# Patient Record
Sex: FEMALE | URBAN - METROPOLITAN AREA
[De-identification: names, ages, dates, MRNs, and addresses within clinical notes are randomized per-mention and may not be internally consistent; named-entity substitution may affect disease eponyms.]

---

## 2023-09-18 RX ORDER — PEN NEEDLE, DIABETIC 32GX 5/32"
1 NEEDLE, DISPOSABLE MISCELLANEOUS 2 TIMES DAILY
Qty: 100 EACH | Refills: 2 | Status: SHIPPED | OUTPATIENT
Start: 2023-09-18 | End: 2023-10-18

## 2023-09-18 RX ORDER — PEN NEEDLE, DIABETIC 32GX 5/32"
1 NEEDLE, DISPOSABLE MISCELLANEOUS 2 TIMES DAILY
COMMUNITY
Start: 2023-07-03 | End: 2023-09-18 | Stop reason: SDUPTHER

## 2023-09-25 NOTE — TELEPHONE ENCOUNTER
Lorenzo Berg is calling to request a refill on the following medication(s):    Medication Request:  Requested Prescriptions     Pending Prescriptions Disp Refills    metoprolol tartrate (LOPRESSOR) 25 MG tablet [Pharmacy Med Name: Metoprolol Tartrate Oral Tablet 25 MG] 90 tablet 0     Sig: TAKE 1 TABLET BY MOUTH EVERY DAY WITH FOOD       Last Visit Date (If Applicable):  Visit date not found    Next Visit Date:    11/20/2023

## 2023-10-10 ENCOUNTER — TELEPHONE (OUTPATIENT)
Age: 78
End: 2023-10-10

## 2023-10-10 NOTE — TELEPHONE ENCOUNTER
Patient called to ask if you are recommending the following vaccines for her (said is diabetic and 68 y/o)  Flu  RSV  Covid booster  Pneumonia - Marlen Sandy said she is due  Tetanus - [facility] said she is due  Please advise [phone]

## 2023-10-10 NOTE — TELEPHONE ENCOUNTER
She can get flu and prevnar 20.   I would wait on RSV for now  COVID vaccine is ok  TdaP usually isn't covered by medicare but her last was 2010

## 2023-11-13 NOTE — PROGRESS NOTES
Granul* 11/14/2023 <0.03     Sodium 11/14/2023 139     Potassium 11/14/2023 4.2     Chloride 11/14/2023 104     CO2 11/14/2023 23     Anion Gap 11/14/2023 12     Glucose 11/14/2023 129 (H)     BUN 11/14/2023 20     Creatinine 11/14/2023 0.7     Est, Glom Filt Rate 11/14/2023 >60     Calcium 11/14/2023 9.3     Total Protein 11/14/2023 7.4     Albumin 11/14/2023 4.1     Albumin/Globulin Ratio 11/14/2023 1.0     Total Bilirubin 11/14/2023 0.5     Alkaline Phosphatase 11/14/2023 81     ALT 11/14/2023 26     AST 11/14/2023 21     Vit D, 25-Hydroxy 11/14/2023 54.2       LDL: 66 on 3/23  Urine micro was normal 3/23    ASSESSMENT/PLAN     - Recent labs were reviewed, results were grossly normal.  - Mammogram was ordered. - DEXA was ordered. - I recommend increased water intake for cramping.  - I advise continuing regular walks, finding somewhere to walk indoors during the cold weather is recommended. - Labs were ordered for completion prior to next visit. - Medicare annual wellness visit was performed in-office today. - Follow-up in 6 months. 1. Essential hypertension  Comments:  Controlled with current regimen of Lopressor and hydrochlorothiazide. 2. Type 2 diabetes mellitus without complication, with long-term current use of insulin ([address])  Comments: Follows with endocrinology. Off [place]b due to hypotension. Orders:  -     CBC with Auto Differential; Future  -     Comprehensive Metabolic Panel; Future  -     Vitamin B12; Future  -     Microalbumin, Ur; Future  3. Mixed hyperlipidemia  Comments:  Controlled with Lipitor. Orders:  -     Lipid Panel; Future  -     T4, Free; Future  -     TSH; Future  4. Osteopenia, unspecified location  Comments:  Controlled with oral calcium and Vitamin D supplements. 5. Screening mammogram for breast cancer  -     [facility] VICTORIA DIGITAL SCREEN BILATERAL; Future  6. Postmenopausal  -     DEXA BONE DENSITY AXIAL SKELETON; Future  7. Facial skin lesion  Comments:   Follows with

## 2023-11-14 ENCOUNTER — HOSPITAL ENCOUNTER (OUTPATIENT)
Age: 78
Setting detail: SPECIMEN
Discharge: HOME | End: 2023-11-14

## 2023-11-14 LAB
25(OH)D3 SERPL-MCNC: 54.2 NG/ML (ref 30–100)
ALBUMIN SERPL-MCNC: 4.1 G/DL (ref 3.5–5.2)
ALBUMIN/GLOB SERPL: 1 {RATIO} (ref 1–2.5)
ALP SERPL-CCNC: 81 U/L (ref 35–104)
ALT SERPL-CCNC: 26 U/L (ref 10–35)
ANION GAP SERPL CALCULATED.3IONS-SCNC: 12 MMOL/L (ref 9–16)
AST SERPL-CCNC: 21 U/L (ref 10–35)
BASOPHILS # BLD: 0.06 K/UL (ref 0–0.2)
BASOPHILS NFR BLD: 1 % (ref 0–2)
BILIRUB SERPL-MCNC: 0.5 MG/DL (ref 0–1.2)
BUN SERPL-MCNC: 20 MG/DL (ref 8–23)
CALCIUM SERPL-MCNC: 9.3 MG/DL (ref 8.6–10.4)
CHLORIDE SERPL-SCNC: 104 MMOL/L (ref 98–107)
CO2 SERPL-SCNC: 23 MMOL/L (ref 20–31)
CREAT SERPL-MCNC: 0.7 MG/DL (ref 0.5–0.9)
EOSINOPHIL # BLD: 0.23 K/UL (ref 0–0.44)
EOSINOPHILS RELATIVE PERCENT: 3 % (ref 1–4)
ERYTHROCYTE [DISTWIDTH] IN BLOOD BY AUTOMATED COUNT: 13.9 % (ref 11.8–14.4)
GFR SERPL CREATININE-BSD FRML MDRD: >60 ML/MIN/1.73M2
GLUCOSE SERPL-MCNC: 129 MG/DL (ref 74–99)
HCT VFR BLD AUTO: 46.1 % (ref 36.3–47.1)
HGB BLD-MCNC: 14.4 G/DL (ref 11.9–15.1)
IMM GRANULOCYTES # BLD AUTO: <0.03 K/UL (ref 0–0.3)
IMM GRANULOCYTES NFR BLD: 0 %
LYMPHOCYTES NFR BLD: 2.19 K/UL (ref 1.1–3.7)
LYMPHOCYTES RELATIVE PERCENT: 30 % (ref 24–43)
MCH RBC QN AUTO: 30.8 PG (ref 25.2–33.5)
MCHC RBC AUTO-ENTMCNC: 31.2 G/DL (ref 28.4–34.8)
MCV RBC AUTO: 98.7 FL (ref 82.6–102.9)
MONOCYTES NFR BLD: 0.63 K/UL (ref 0.1–1.2)
MONOCYTES NFR BLD: 9 % (ref 3–12)
NEUTROPHILS NFR BLD: 57 % (ref 36–65)
NEUTS SEG NFR BLD: 4.3 K/UL (ref 1.5–8.1)
NRBC BLD-RTO: 0 PER 100 WBC
PLATELET # BLD AUTO: 263 K/UL (ref 138–453)
PMV BLD AUTO: 10.6 FL (ref 8.1–13.5)
POTASSIUM SERPL-SCNC: 4.2 MMOL/L (ref 3.7–5.3)
PROT SERPL-MCNC: 7.4 G/DL (ref 6.6–8.7)
RBC # BLD AUTO: 4.67 M/UL (ref 3.95–5.11)
SODIUM SERPL-SCNC: 139 MMOL/L (ref 136–145)
WBC OTHER # BLD: 7.4 K/UL (ref 3.5–11.3)

## 2023-11-18 DIAGNOSIS — L57.0 ACTINIC KERATOSES: Primary | ICD-10-CM

## 2023-11-18 DIAGNOSIS — E55.9 VITAMIN D DEFICIENCY: ICD-10-CM

## 2023-11-18 DIAGNOSIS — N20.0 KIDNEY STONE: ICD-10-CM

## 2023-11-18 PROBLEM — J30.9 ALLERGIC RHINITIS: Status: ACTIVE | Noted: 2023-11-18

## 2023-11-18 PROBLEM — K21.9 GERD (GASTROESOPHAGEAL REFLUX DISEASE): Status: ACTIVE | Noted: 2023-11-18

## 2023-11-18 PROBLEM — E11.9 TYPE 2 DIABETES MELLITUS WITHOUT COMPLICATION (HCC): Status: ACTIVE | Noted: 2023-11-18

## 2023-11-18 PROBLEM — E78.2 MIXED HYPERLIPIDEMIA: Status: ACTIVE | Noted: 2023-04-12

## 2023-11-18 PROBLEM — I10 ESSENTIAL HYPERTENSION: Status: ACTIVE | Noted: 2023-04-12

## 2023-11-18 PROBLEM — M85.80 OSTEOPENIA: Status: ACTIVE | Noted: 2023-04-12

## 2023-11-18 RX ORDER — METFORMIN HYDROCHLORIDE 500 MG/1
TABLET, EXTENDED RELEASE ORAL
COMMUNITY
Start: 2023-10-06

## 2023-11-18 RX ORDER — ASPIRIN 81 MG/1
TABLET ORAL
COMMUNITY
Start: 2008-12-09

## 2023-11-18 RX ORDER — PIOGLITAZONEHYDROCHLORIDE 15 MG/1
TABLET ORAL
COMMUNITY
Start: 2023-09-23

## 2023-11-18 RX ORDER — DAPAGLIFLOZIN 10 MG/1
10 TABLET, FILM COATED ORAL EVERY MORNING
COMMUNITY
Start: 2023-10-06

## 2023-11-18 RX ORDER — VALSARTAN AND HYDROCHLOROTHIAZIDE 160; 12.5 MG/1; MG/1
TABLET, FILM COATED ORAL
COMMUNITY

## 2023-11-18 RX ORDER — GLIMEPIRIDE 4 MG/1
TABLET ORAL
COMMUNITY
Start: 2023-09-26

## 2023-11-18 RX ORDER — ATORVASTATIN CALCIUM 20 MG/1
20 TABLET, FILM COATED ORAL
COMMUNITY
Start: 2023-09-20

## 2023-11-18 RX ORDER — INSULIN DETEMIR 100 [IU]/ML
INJECTION, SOLUTION SUBCUTANEOUS
COMMUNITY
Start: 2023-09-07

## 2023-11-18 RX ORDER — BLOOD-GLUCOSE METER
KIT MISCELLANEOUS
COMMUNITY
Start: 2023-08-15

## 2023-11-18 RX ORDER — HYDROCHLOROTHIAZIDE 12.5 MG/1
CAPSULE, GELATIN COATED ORAL DAILY
COMMUNITY
Start: 2023-09-15

## 2023-11-20 ENCOUNTER — OFFICE VISIT (OUTPATIENT)
Age: 78
End: 2023-11-20
Payer: MEDICARE

## 2023-11-20 VITALS
TEMPERATURE: 97.8 F | HEIGHT: 64 IN | DIASTOLIC BLOOD PRESSURE: 74 MMHG | HEART RATE: 84 BPM | BODY MASS INDEX: 33.12 KG/M2 | RESPIRATION RATE: 12 BRPM | WEIGHT: 194 LBS | OXYGEN SATURATION: 94 % | SYSTOLIC BLOOD PRESSURE: 128 MMHG

## 2023-11-20 DIAGNOSIS — I10 ESSENTIAL HYPERTENSION: Primary | ICD-10-CM

## 2023-11-20 DIAGNOSIS — L98.9 FACIAL SKIN LESION: ICD-10-CM

## 2023-11-20 DIAGNOSIS — M85.80 OSTEOPENIA, UNSPECIFIED LOCATION: ICD-10-CM

## 2023-11-20 DIAGNOSIS — Z79.4 TYPE 2 DIABETES MELLITUS WITHOUT COMPLICATION, WITH LONG-TERM CURRENT USE OF INSULIN (HCC): ICD-10-CM

## 2023-11-20 DIAGNOSIS — E11.9 TYPE 2 DIABETES MELLITUS WITHOUT COMPLICATION, WITH LONG-TERM CURRENT USE OF INSULIN (HCC): ICD-10-CM

## 2023-11-20 DIAGNOSIS — Z78.0 POSTMENOPAUSAL: ICD-10-CM

## 2023-11-20 DIAGNOSIS — E78.2 MIXED HYPERLIPIDEMIA: ICD-10-CM

## 2023-11-20 DIAGNOSIS — Z12.31 SCREENING MAMMOGRAM FOR BREAST CANCER: ICD-10-CM

## 2023-11-20 DIAGNOSIS — Z00.00 MEDICARE ANNUAL WELLNESS VISIT, SUBSEQUENT: ICD-10-CM

## 2023-11-20 DIAGNOSIS — R01.1 HEART MURMUR: ICD-10-CM

## 2023-11-20 PROCEDURE — 3078F DIAST BP <80 MM HG: CPT | Performed by: FAMILY MEDICINE

## 2023-11-20 PROCEDURE — 1090F PRES/ABSN URINE INCON ASSESS: CPT | Performed by: FAMILY MEDICINE

## 2023-11-20 PROCEDURE — G8417 CALC BMI ABV UP PARAM F/U: HCPCS | Performed by: FAMILY MEDICINE

## 2023-11-20 PROCEDURE — 3074F SYST BP LT 130 MM HG: CPT | Performed by: FAMILY MEDICINE

## 2023-11-20 PROCEDURE — G0439 PPPS, SUBSEQ VISIT: HCPCS | Performed by: FAMILY MEDICINE

## 2023-11-20 PROCEDURE — 1123F ACP DISCUSS/DSCN MKR DOCD: CPT | Performed by: FAMILY MEDICINE

## 2023-11-20 PROCEDURE — G8484 FLU IMMUNIZE NO ADMIN: HCPCS | Performed by: FAMILY MEDICINE

## 2023-11-20 PROCEDURE — G8427 DOCREV CUR MEDS BY ELIG CLIN: HCPCS | Performed by: FAMILY MEDICINE

## 2023-11-20 PROCEDURE — 99214 OFFICE O/P EST MOD 30 MIN: CPT | Performed by: FAMILY MEDICINE

## 2023-11-20 PROCEDURE — 4004F PT TOBACCO SCREEN RCVD TLK: CPT | Performed by: FAMILY MEDICINE

## 2023-11-20 PROCEDURE — G8400 PT W/DXA NO RESULTS DOC: HCPCS | Performed by: FAMILY MEDICINE

## 2023-11-20 RX ORDER — FOLIC ACID 0.8 MG
500 TABLET ORAL DAILY
COMMUNITY

## 2023-11-20 RX ORDER — LANOLIN ALCOHOL/MO/W.PET/CERES
1000 CREAM (GRAM) TOPICAL DAILY
COMMUNITY

## 2023-11-20 SDOH — ECONOMIC STABILITY: INCOME INSECURITY: HOW HARD IS IT FOR YOU TO PAY FOR THE VERY BASICS LIKE FOOD, HOUSING, MEDICAL CARE, AND HEATING?: NOT HARD AT ALL

## 2023-11-20 SDOH — ECONOMIC STABILITY: FOOD INSECURITY: WITHIN THE PAST 12 MONTHS, YOU WORRIED THAT YOUR FOOD WOULD RUN OUT BEFORE YOU GOT MONEY TO BUY MORE.: NEVER TRUE

## 2023-11-20 SDOH — ECONOMIC STABILITY: HOUSING INSECURITY
IN THE LAST 12 MONTHS, WAS THERE A TIME WHEN YOU DID NOT HAVE A STEADY PLACE TO SLEEP OR SLEPT IN A SHELTER (INCLUDING NOW)?: NO

## 2023-11-20 SDOH — ECONOMIC STABILITY: FOOD INSECURITY: WITHIN THE PAST 12 MONTHS, THE FOOD YOU BOUGHT JUST DIDN'T LAST AND YOU DIDN'T HAVE MONEY TO GET MORE.: NEVER TRUE

## 2023-11-20 ASSESSMENT — PATIENT HEALTH QUESTIONNAIRE - PHQ9
2. FEELING DOWN, DEPRESSED OR HOPELESS: 0
SUM OF ALL RESPONSES TO PHQ9 QUESTIONS 1 & 2: 0
SUM OF ALL RESPONSES TO PHQ QUESTIONS 1-9: 0
1. LITTLE INTEREST OR PLEASURE IN DOING THINGS: 0

## 2023-11-20 NOTE — PATIENT INSTRUCTIONS
- Recent labs were reviewed, results were grossly normal.  - Mammogram was ordered. - DEXA was ordered. - I recommend increased water intake for cramping.  - I advise continuing regular walks, finding somewhere to walk indoors during the cold weather is recommended. - Labs were ordered for completion prior to next visit. - Medicare annual wellness visit was performed in-office today. - Follow-up in 6 months.

## 2023-12-16 PROBLEM — J15.9 BACTERIAL PNEUMONIA: Status: ACTIVE | Noted: 2023-12-16

## 2023-12-17 PROBLEM — E11.9 INSULIN DEPENDENT TYPE 2 DIABETES MELLITUS (HCC): Status: ACTIVE | Noted: 2023-12-17

## 2023-12-17 PROBLEM — E87.1 HYPONATREMIA: Status: ACTIVE | Noted: 2023-12-17

## 2023-12-17 PROBLEM — R78.81 BACTEREMIA: Status: ACTIVE | Noted: 2023-12-17

## 2023-12-17 PROBLEM — R79.89 ELEVATED TROPONIN: Status: ACTIVE | Noted: 2023-12-17

## 2023-12-17 PROBLEM — N39.0 SEPSIS DUE TO GRAM-NEGATIVE UTI (HCC): Status: ACTIVE | Noted: 2023-12-17

## 2023-12-17 PROBLEM — Z79.4 INSULIN DEPENDENT TYPE 2 DIABETES MELLITUS (HCC): Status: ACTIVE | Noted: 2023-12-17

## 2023-12-17 PROBLEM — G92.8 TOXIC METABOLIC ENCEPHALOPATHY: Status: ACTIVE | Noted: 2023-12-17

## 2023-12-17 PROBLEM — N17.9 ACUTE KIDNEY INJURY (HCC): Status: ACTIVE | Noted: 2023-12-17

## 2023-12-17 PROBLEM — A41.50 SEPSIS DUE TO GRAM-NEGATIVE UTI (HCC): Status: ACTIVE | Noted: 2023-12-17

## 2023-12-19 PROBLEM — N12 PYELITIS: Status: ACTIVE | Noted: 2023-12-19

## 2023-12-19 PROBLEM — N20.1 CALCULUS OF PROXIMAL RIGHT URETER: Status: ACTIVE | Noted: 2023-12-19

## 2023-12-19 PROBLEM — A41.51 SEPSIS DUE TO ESCHERICHIA COLI WITHOUT ACUTE ORGAN DYSFUNCTION (HCC): Status: ACTIVE | Noted: 2023-12-19

## 2023-12-19 PROBLEM — R41.82 ALTERED MENTAL STATUS: Status: ACTIVE | Noted: 2023-12-19

## 2023-12-21 PROBLEM — G92.8 TOXIC METABOLIC ENCEPHALOPATHY: Status: RESOLVED | Noted: 2023-12-17 | Resolved: 2023-12-21

## 2023-12-21 PROBLEM — N17.9 ACUTE KIDNEY INJURY (HCC): Status: RESOLVED | Noted: 2023-12-17 | Resolved: 2023-12-21

## 2023-12-21 PROBLEM — E87.1 HYPONATREMIA: Status: RESOLVED | Noted: 2023-12-17 | Resolved: 2023-12-21

## 2023-12-21 PROBLEM — R41.82 ALTERED MENTAL STATUS: Status: RESOLVED | Noted: 2023-12-19 | Resolved: 2023-12-21

## 2023-12-21 PROBLEM — R79.89 ELEVATED TROPONIN: Status: RESOLVED | Noted: 2023-12-17 | Resolved: 2023-12-21

## 2023-12-21 PROBLEM — J15.9 BACTERIAL PNEUMONIA: Status: RESOLVED | Noted: 2023-12-16 | Resolved: 2023-12-21

## 2023-12-27 ENCOUNTER — OFFICE VISIT (OUTPATIENT)
Age: 78
End: 2023-12-27

## 2023-12-27 VITALS
OXYGEN SATURATION: 95 % | WEIGHT: 188 LBS | RESPIRATION RATE: 14 BRPM | TEMPERATURE: 98 F | HEIGHT: 65 IN | SYSTOLIC BLOOD PRESSURE: 120 MMHG | HEART RATE: 102 BPM | BODY MASS INDEX: 31.32 KG/M2 | DIASTOLIC BLOOD PRESSURE: 64 MMHG

## 2023-12-27 DIAGNOSIS — N17.9 SEPSIS DUE TO ESCHERICHIA COLI WITH ACUTE RENAL FAILURE WITHOUT SEPTIC SHOCK, UNSPECIFIED ACUTE RENAL FAILURE TYPE (HCC): Primary | ICD-10-CM

## 2023-12-27 DIAGNOSIS — N20.1 CALCULUS OF PROXIMAL RIGHT URETER: ICD-10-CM

## 2023-12-27 DIAGNOSIS — Z79.4 TYPE 2 DIABETES MELLITUS WITHOUT COMPLICATION, WITH LONG-TERM CURRENT USE OF INSULIN (HCC): ICD-10-CM

## 2023-12-27 DIAGNOSIS — E11.9 TYPE 2 DIABETES MELLITUS WITHOUT COMPLICATION, WITH LONG-TERM CURRENT USE OF INSULIN (HCC): ICD-10-CM

## 2023-12-27 DIAGNOSIS — Z09 HOSPITAL DISCHARGE FOLLOW-UP: ICD-10-CM

## 2023-12-27 DIAGNOSIS — A41.51 SEPSIS DUE TO ESCHERICHIA COLI WITH ACUTE RENAL FAILURE WITHOUT SEPTIC SHOCK, UNSPECIFIED ACUTE RENAL FAILURE TYPE (HCC): Primary | ICD-10-CM

## 2023-12-27 DIAGNOSIS — R65.20 SEPSIS DUE TO ESCHERICHIA COLI WITH ACUTE RENAL FAILURE WITHOUT SEPTIC SHOCK, UNSPECIFIED ACUTE RENAL FAILURE TYPE (HCC): Primary | ICD-10-CM

## 2023-12-27 NOTE — PROGRESS NOTES
11/14/2023 104     CO2 11/14/2023 23     Anion Gap 11/14/2023 12     Glucose 11/14/2023 129 (H)     BUN 11/14/2023 20     Creatinine 11/14/2023 0.7     Est, Glom Filt Rate 11/14/2023 >60     Calcium 11/14/2023 9.3     Total Protein 11/14/2023 7.4     Albumin 11/14/2023 4.1     Albumin/Globulin Ratio 11/14/2023 1.0     Total Bilirubin 11/14/2023 0.5     Alkaline Phosphatase 11/14/2023 81     ALT 11/14/2023 26     AST 11/14/2023 21     Vit D, 25-Hydroxy 11/14/2023 54.2         ASSESSMENT/PLAN     1. Sepsis due to Escherichia coli with acute renal failure without septic shock, unspecified acute renal failure type ([address])  -     CBC with Auto Differential; Future  -     Comprehensive Metabolic Panel; Future  2. Hospital discharge follow-up  -     CT DISCHARGE MEDS RECONCILED W/ CURRENT OUTPATIENT MED LIST  3. Type 2 diabetes mellitus without complication, with long-term current use of insulin ([facility])  4. Calculus of proximal right ureter       Plans for cysto/stent removal 1/5/23  [facility] endocrinology  Discussed Good BS control/prevent hypoglycemia  Echo for heart murmur scheduled January 2024      Return in 1 month (on 1/27/2024).         Electronically signed by Jin Quesada DO on 12/27/2023 at 6:25 PM

## 2024-01-02 ENCOUNTER — HOSPITAL ENCOUNTER (OUTPATIENT)
Age: 79
Setting detail: SPECIMEN
Discharge: HOME | End: 2024-01-02

## 2024-01-02 ENCOUNTER — ANESTHESIA EVENT (OUTPATIENT)
Dept: OPERATING ROOM | Age: 79
End: 2024-01-02
Payer: MEDICARE

## 2024-01-02 LAB
ALBUMIN SERPL-MCNC: 3.8 G/DL (ref 3.5–5.2)
ALBUMIN/GLOB SERPL: 1 {RATIO} (ref 1–2.5)
ALP SERPL-CCNC: 104 U/L (ref 35–104)
ALT SERPL-CCNC: 26 U/L (ref 5–33)
ANION GAP SERPL CALCULATED.3IONS-SCNC: 14 MMOL/L (ref 9–17)
AST SERPL-CCNC: 22 U/L
BASOPHILS # BLD: 0.09 K/UL (ref 0–0.2)
BASOPHILS NFR BLD: 1 % (ref 0–2)
BILIRUB SERPL-MCNC: 0.4 MG/DL (ref 0.3–1.2)
BUN SERPL-MCNC: 23 MG/DL (ref 8–23)
CALCIUM SERPL-MCNC: 9.7 MG/DL (ref 8.6–10.4)
CHLORIDE SERPL-SCNC: 101 MMOL/L (ref 98–107)
CO2 SERPL-SCNC: 22 MMOL/L (ref 20–31)
CREAT SERPL-MCNC: 1.1 MG/DL (ref 0.5–0.9)
EOSINOPHIL # BLD: 0.15 K/UL (ref 0–0.44)
EOSINOPHILS RELATIVE PERCENT: 2 % (ref 1–4)
ERYTHROCYTE [DISTWIDTH] IN BLOOD BY AUTOMATED COUNT: 14.4 % (ref 11.8–14.4)
GFR SERPL CREATININE-BSD FRML MDRD: 52 ML/MIN/1.73M2
GLUCOSE SERPL-MCNC: 157 MG/DL (ref 70–99)
HCT VFR BLD AUTO: 41.8 % (ref 36.3–47.1)
HGB BLD-MCNC: 13.2 G/DL (ref 11.9–15.1)
IMM GRANULOCYTES # BLD AUTO: 0.03 K/UL (ref 0–0.3)
IMM GRANULOCYTES NFR BLD: 0 %
LYMPHOCYTES NFR BLD: 1.92 K/UL (ref 1.1–3.7)
LYMPHOCYTES RELATIVE PERCENT: 20 % (ref 24–43)
MCH RBC QN AUTO: 30.3 PG (ref 25.2–33.5)
MCHC RBC AUTO-ENTMCNC: 31.6 G/DL (ref 28.4–34.8)
MCV RBC AUTO: 96.1 FL (ref 82.6–102.9)
MONOCYTES NFR BLD: 0.65 K/UL (ref 0.1–1.2)
MONOCYTES NFR BLD: 7 % (ref 3–12)
NEUTROPHILS NFR BLD: 71 % (ref 36–65)
NEUTS SEG NFR BLD: 6.82 K/UL (ref 1.5–8.1)
NRBC BLD-RTO: 0 PER 100 WBC
PLATELET # BLD AUTO: ABNORMAL K/UL (ref 138–453)
PLATELET, FLUORESCENCE: ABNORMAL K/UL (ref 138–453)
POTASSIUM SERPL-SCNC: 4.2 MMOL/L (ref 3.7–5.3)
PROT SERPL-MCNC: 7.8 G/DL (ref 6.4–8.3)
RBC # BLD AUTO: 4.35 M/UL (ref 3.95–5.11)
SODIUM SERPL-SCNC: 137 MMOL/L (ref 135–144)
WBC OTHER # BLD: 9.7 K/UL (ref 3.5–11.3)

## 2024-01-05 ENCOUNTER — APPOINTMENT (OUTPATIENT)
Dept: GENERAL RADIOLOGY | Age: 79
End: 2024-01-05
Attending: SPECIALIST
Payer: MEDICARE

## 2024-01-05 ENCOUNTER — ANESTHESIA (OUTPATIENT)
Dept: OPERATING ROOM | Age: 79
End: 2024-01-05
Payer: MEDICARE

## 2024-01-05 ENCOUNTER — HOSPITAL ENCOUNTER (OUTPATIENT)
Age: 79
Setting detail: OUTPATIENT SURGERY
Discharge: HOME | End: 2024-01-05
Attending: SPECIALIST | Admitting: SPECIALIST
Payer: MEDICARE

## 2024-01-05 VITALS
WEIGHT: 187.4 LBS | BODY MASS INDEX: 31.22 KG/M2 | DIASTOLIC BLOOD PRESSURE: 59 MMHG | OXYGEN SATURATION: 95 % | HEART RATE: 86 BPM | TEMPERATURE: 97.3 F | RESPIRATION RATE: 24 BRPM | SYSTOLIC BLOOD PRESSURE: 130 MMHG | HEIGHT: 65 IN

## 2024-01-05 LAB — GLUCOSE BLD-MCNC: 130 MG/DL (ref 65–105)

## 2024-01-05 PROCEDURE — 2580000003 HC RX 258: Performed by: SPECIALIST

## 2024-01-05 PROCEDURE — 52351 CYSTOURETERO & OR PYELOSCOPE: CPT | Performed by: SPECIALIST

## 2024-01-05 PROCEDURE — 82947 ASSAY GLUCOSE BLOOD QUANT: CPT

## 2024-01-05 PROCEDURE — 3700000000 HC ANESTHESIA ATTENDED CARE: Performed by: SPECIALIST

## 2024-01-05 PROCEDURE — 7100000001 HC PACU RECOVERY - ADDTL 15 MIN: Performed by: SPECIALIST

## 2024-01-05 PROCEDURE — 6360000002 HC RX W HCPCS: Performed by: SPECIALIST

## 2024-01-05 PROCEDURE — 2580000003 HC RX 258: Performed by: ANESTHESIOLOGY

## 2024-01-05 PROCEDURE — 7100000000 HC PACU RECOVERY - FIRST 15 MIN: Performed by: SPECIALIST

## 2024-01-05 PROCEDURE — 7100000010 HC PHASE II RECOVERY - FIRST 15 MIN: Performed by: SPECIALIST

## 2024-01-05 PROCEDURE — 3600000004 HC SURGERY LEVEL 4 BASE: Performed by: SPECIALIST

## 2024-01-05 PROCEDURE — 2709999900 HC NON-CHARGEABLE SUPPLY: Performed by: SPECIALIST

## 2024-01-05 PROCEDURE — 2500000003 HC RX 250 WO HCPCS: Performed by: NURSE ANESTHETIST, CERTIFIED REGISTERED

## 2024-01-05 PROCEDURE — C1747 HC ENDOSCOPE, SINGLE, URINARY TRACT: HCPCS | Performed by: SPECIALIST

## 2024-01-05 PROCEDURE — 52332 CYSTOSCOPY AND TREATMENT: CPT | Performed by: SPECIALIST

## 2024-01-05 PROCEDURE — 7100000011 HC PHASE II RECOVERY - ADDTL 15 MIN: Performed by: SPECIALIST

## 2024-01-05 PROCEDURE — 6360000002 HC RX W HCPCS: Performed by: NURSE ANESTHETIST, CERTIFIED REGISTERED

## 2024-01-05 PROCEDURE — C1758 CATHETER, URETERAL: HCPCS | Performed by: SPECIALIST

## 2024-01-05 PROCEDURE — 93005 ELECTROCARDIOGRAM TRACING: CPT

## 2024-01-05 PROCEDURE — C1769 GUIDE WIRE: HCPCS | Performed by: SPECIALIST

## 2024-01-05 PROCEDURE — 3600000014 HC SURGERY LEVEL 4 ADDTL 15MIN: Performed by: SPECIALIST

## 2024-01-05 PROCEDURE — C2617 STENT, NON-COR, TEM W/O DEL: HCPCS | Performed by: SPECIALIST

## 2024-01-05 PROCEDURE — 3700000001 HC ADD 15 MINUTES (ANESTHESIA): Performed by: SPECIALIST

## 2024-01-05 DEVICE — URETERAL STENT WITH SIDE HOLES 7FX24CM
Type: IMPLANTABLE DEVICE | Site: URETER | Status: FUNCTIONAL
Brand: TRIA™ FIRM

## 2024-01-05 RX ORDER — ONDANSETRON 2 MG/ML
4 INJECTION INTRAMUSCULAR; INTRAVENOUS
Status: DISCONTINUED | OUTPATIENT
Start: 2024-01-05 | End: 2024-01-05 | Stop reason: HOSPADM

## 2024-01-05 RX ORDER — PROPOFOL 10 MG/ML
INJECTION, EMULSION INTRAVENOUS PRN
Status: DISCONTINUED | OUTPATIENT
Start: 2024-01-05 | End: 2024-01-05 | Stop reason: SDUPTHER

## 2024-01-05 RX ORDER — FENTANYL CITRATE 50 UG/ML
INJECTION, SOLUTION INTRAMUSCULAR; INTRAVENOUS PRN
Status: DISCONTINUED | OUTPATIENT
Start: 2024-01-05 | End: 2024-01-05 | Stop reason: SDUPTHER

## 2024-01-05 RX ORDER — OXYCODONE HYDROCHLORIDE 5 MG/1
10 TABLET ORAL PRN
Status: DISCONTINUED | OUTPATIENT
Start: 2024-01-05 | End: 2024-01-05 | Stop reason: HOSPADM

## 2024-01-05 RX ORDER — SODIUM CHLORIDE 0.9 % (FLUSH) 0.9 %
5-40 SYRINGE (ML) INJECTION EVERY 12 HOURS SCHEDULED
Status: DISCONTINUED | OUTPATIENT
Start: 2024-01-05 | End: 2024-01-05 | Stop reason: HOSPADM

## 2024-01-05 RX ORDER — SODIUM CHLORIDE 0.9 % (FLUSH) 0.9 %
5-40 SYRINGE (ML) INJECTION PRN
Status: DISCONTINUED | OUTPATIENT
Start: 2024-01-05 | End: 2024-01-05 | Stop reason: HOSPADM

## 2024-01-05 RX ORDER — ONDANSETRON 2 MG/ML
INJECTION INTRAMUSCULAR; INTRAVENOUS PRN
Status: DISCONTINUED | OUTPATIENT
Start: 2024-01-05 | End: 2024-01-05 | Stop reason: SDUPTHER

## 2024-01-05 RX ORDER — HYDRALAZINE HYDROCHLORIDE 20 MG/ML
10 INJECTION INTRAMUSCULAR; INTRAVENOUS
Status: DISCONTINUED | OUTPATIENT
Start: 2024-01-05 | End: 2024-01-05 | Stop reason: HOSPADM

## 2024-01-05 RX ORDER — MORPHINE SULFATE 2 MG/ML
1 INJECTION, SOLUTION INTRAMUSCULAR; INTRAVENOUS EVERY 5 MIN PRN
Status: DISCONTINUED | OUTPATIENT
Start: 2024-01-05 | End: 2024-01-05 | Stop reason: HOSPADM

## 2024-01-05 RX ORDER — SODIUM CHLORIDE 9 MG/ML
INJECTION, SOLUTION INTRAVENOUS PRN
Status: DISCONTINUED | OUTPATIENT
Start: 2024-01-05 | End: 2024-01-05 | Stop reason: HOSPADM

## 2024-01-05 RX ORDER — KETOROLAC TROMETHAMINE 30 MG/ML
INJECTION, SOLUTION INTRAMUSCULAR; INTRAVENOUS PRN
Status: DISCONTINUED | OUTPATIENT
Start: 2024-01-05 | End: 2024-01-05 | Stop reason: SDUPTHER

## 2024-01-05 RX ORDER — DEXAMETHASONE SODIUM PHOSPHATE 10 MG/ML
INJECTION, SOLUTION INTRAMUSCULAR; INTRAVENOUS PRN
Status: DISCONTINUED | OUTPATIENT
Start: 2024-01-05 | End: 2024-01-05 | Stop reason: SDUPTHER

## 2024-01-05 RX ORDER — DIPHENHYDRAMINE HYDROCHLORIDE 50 MG/ML
12.5 INJECTION INTRAMUSCULAR; INTRAVENOUS
Status: DISCONTINUED | OUTPATIENT
Start: 2024-01-05 | End: 2024-01-05 | Stop reason: HOSPADM

## 2024-01-05 RX ORDER — LABETALOL HYDROCHLORIDE 5 MG/ML
10 INJECTION, SOLUTION INTRAVENOUS
Status: DISCONTINUED | OUTPATIENT
Start: 2024-01-05 | End: 2024-01-05 | Stop reason: HOSPADM

## 2024-01-05 RX ORDER — OXYCODONE HYDROCHLORIDE 5 MG/1
5 TABLET ORAL PRN
Status: DISCONTINUED | OUTPATIENT
Start: 2024-01-05 | End: 2024-01-05 | Stop reason: HOSPADM

## 2024-01-05 RX ORDER — SODIUM CHLORIDE, SODIUM LACTATE, POTASSIUM CHLORIDE, CALCIUM CHLORIDE 600; 310; 30; 20 MG/100ML; MG/100ML; MG/100ML; MG/100ML
INJECTION, SOLUTION INTRAVENOUS CONTINUOUS
Status: DISCONTINUED | OUTPATIENT
Start: 2024-01-05 | End: 2024-01-05 | Stop reason: HOSPADM

## 2024-01-05 RX ORDER — LIDOCAINE HYDROCHLORIDE 10 MG/ML
INJECTION, SOLUTION INFILTRATION; PERINEURAL PRN
Status: DISCONTINUED | OUTPATIENT
Start: 2024-01-05 | End: 2024-01-05 | Stop reason: SDUPTHER

## 2024-01-05 RX ORDER — MIDAZOLAM HYDROCHLORIDE 2 MG/2ML
2 INJECTION, SOLUTION INTRAMUSCULAR; INTRAVENOUS
Status: DISCONTINUED | OUTPATIENT
Start: 2024-01-05 | End: 2024-01-05 | Stop reason: HOSPADM

## 2024-01-05 RX ORDER — METOCLOPRAMIDE HYDROCHLORIDE 5 MG/ML
10 INJECTION INTRAMUSCULAR; INTRAVENOUS
Status: DISCONTINUED | OUTPATIENT
Start: 2024-01-05 | End: 2024-01-05 | Stop reason: HOSPADM

## 2024-01-05 RX ORDER — CEFTRIAXONE 1 G/1
INJECTION, POWDER, FOR SOLUTION INTRAMUSCULAR; INTRAVENOUS
Status: DISCONTINUED
Start: 2024-01-05 | End: 2024-01-05 | Stop reason: HOSPADM

## 2024-01-05 RX ADMIN — SODIUM CHLORIDE: 9 INJECTION, SOLUTION INTRAVENOUS at 10:13

## 2024-01-05 RX ADMIN — FENTANYL CITRATE 100 MCG: 50 INJECTION, SOLUTION INTRAMUSCULAR; INTRAVENOUS at 12:20

## 2024-01-05 RX ADMIN — DEXAMETHASONE SODIUM PHOSPHATE 10 MG: 10 INJECTION INTRAMUSCULAR; INTRAVENOUS at 12:24

## 2024-01-05 RX ADMIN — CEFTRIAXONE SODIUM 1000 MG: 1 INJECTION, POWDER, FOR SOLUTION INTRAMUSCULAR; INTRAVENOUS at 12:20

## 2024-01-05 RX ADMIN — LIDOCAINE HYDROCHLORIDE 50 MG: 10 INJECTION, SOLUTION INFILTRATION; PERINEURAL at 12:20

## 2024-01-05 RX ADMIN — PROPOFOL 150 MG: 10 INJECTION, EMULSION INTRAVENOUS at 12:20

## 2024-01-05 RX ADMIN — ONDANSETRON 4 MG: 2 INJECTION INTRAMUSCULAR; INTRAVENOUS at 12:24

## 2024-01-05 RX ADMIN — KETOROLAC TROMETHAMINE 30 MG: 30 INJECTION, SOLUTION INTRAMUSCULAR; INTRAVENOUS at 12:24

## 2024-01-05 ASSESSMENT — PAIN - FUNCTIONAL ASSESSMENT: PAIN_FUNCTIONAL_ASSESSMENT: NONE - DENIES PAIN

## 2024-01-05 NOTE — OP NOTE
Operative Note      Patient: Tracey Gonsalez  YOB: 1945  MRN: [id number]    Date of Procedure: 1/5/2024    Pre-Op Diagnosis Codes:     * Calculus of proximal right ureter [N20.1]    Post-Op Diagnosis: Same       Procedure(s):  CYSTOSCOPY RIGHT URETEROSCOPY RIGHT STENT EXCHANGE, HOLMIUM LASER ON STANDBY    Surgeon(s):  Pedro Pablo Proctor MD    Assistant:   * No surgical staff found *    Anesthesia: General    Estimated Blood Loss (mL): Minimal    Complications: None    Specimens:   * No specimens in log *    Implants:  Implant Name Type Inv. Item Serial No.  Lot No. LRB No. Used Action   STENT URET 6FR L24CM PERCFLX HYDR+ DBL PGTL THRD 2 - [id number]  STENT URET 6FR L24CM PERCFLX HYDR+ DBL PGTL THRD 2  [employer]- [id number] Right 1 Explanted   STENT URETERAL 7 FRX24 CM FIRM MONOFILAMENT TRIA - [id number]  STENT URETERAL 7 FRX24 CM FIRM MONOFILAMENT TRIA  [employer]- [id number] Right 1 Implanted         Drains:   [REMOVED] Ureteral Drain/Stent 12/19/23 Right Ureter (Removed)   Site Assessment Clean, dry & intact 12/19/23 1600       Findings: No residual ureteral or renal calculus present.        Detailed Description of Procedure:   INDICATIONS FOR PROCEDURE:  Tracey Gonsalez is a 78 y.o. female presents with a 2 mm Right sided proximal calculus.  Patient had undergone previous stent placement on 12/19/23.  Patient here today for definitive management in the form of ureteroscopy, holmium laser lithotripsy and/or stone basketing.     After the risks, benefits, alternatives, of the procedure were discussed with the patient, informed consent was obtained.  The patient elected to proceed.  Patient given Rocephin 1 gm IV on call to OR.     DETAILS OF THE PROCEDURE:  The patient was brought back from the preoperative holding area to the operating suite, and was transferred to the operating table where the patient lay in supine position. EPC's were in place, connected to the

## 2024-01-05 NOTE — ANESTHESIA POSTPROCEDURE EVALUATION
Department of Anesthesiology  Postprocedure Note    Patient: Tracey Gonsalez  MRN: [id number]  YOB: 1945  Date of evaluation: 1/5/2024    Procedure Summary       Date: 01/05/24 Room / Location: [address]    Anesthesia Start: 1216 Anesthesia Stop: 1255    Procedure: CYSTOSCOPY RIGHT URETEROSCOPY RIGHT STENT EXCHANGE, HOLMIUM LASER ON STANDBY (Right) Diagnosis:       Calculus of proximal right ureter      (Calculus of proximal right ureter [N20.1])    Surgeons: Pedro Pablo Proctor MD Responsible Provider: Kate Suggs MD    Anesthesia Type: general ASA Status: 3            Anesthesia Type: No value filed.    Yaz Phase I: Yaz Score: 8    Yaz Phase II:      Anesthesia Post Evaluation    Patient location during evaluation: PACU  Patient participation: complete - patient participated  Level of consciousness: awake and alert  Airway patency: patent  Nausea & Vomiting: no nausea and no vomiting  Cardiovascular status: hemodynamically stable  Respiratory status: room air and spontaneous ventilation  Hydration status: euvolemic  Multimodal analgesia pain management approach  Pain management: adequate    No notable events documented.

## 2024-01-05 NOTE — H&P
[facility] Urology  Pedro Pablo Proctor MD [facility]    History and Physical    Patient:  Tracey Gonsalez  MRN: [id number]  YOB: 1945    CHIEF COMPLAINT:  Right ureteral calculus and previous pyelonephritis    HISTORY OF PRESENT ILLNESS:   The patient is a 78 y.o. female who presents with:  Symptom: right flank pain, fever, chills  Symptom onset has been acute for a time period of several day(s).   Severity is described as moderate.   The course of symptoms over time is improving.  Patient's urine and blood cultures show E coli sensitive to Rocephin.  CT abdomen and pelvis without contrast (stone protocol) shows some mild right hydronephosis and a 2 mm density within the proximal right ureter.   Patient underwent cysto, right stent placement on 12/19/23.  Patient here today for Cystoscopy, Right ureteroscopy and stone basketing with ureteral stent exchange ([id number]) under GA.     Patient's old records, notes and chart reviewed and summarized above.     Past Medical History:    Past Medical History:   Diagnosis Date    Actinic keratoses     Allergic rhinitis     GERD (gastroesophageal reflux disease)     Hyperlipidemia     Hypertension     Kidney stone     Osteopenia after menopause     Type 2 diabetes mellitus without complication (HCC)     Vitamin D deficiency        Past Surgical History:    Past Surgical History:   Procedure Laterality Date    COLONOSCOPY      CYSTOSCOPY Right 12/19/2023    CYSTOSCOPY RIGHT URETERAL STENT INSERTION performed by Pedro Pablo Proctor MD at [facility] OR    DILATION AND CURETTAGE OF UTERUS         Medications Prior to Admission:    Prior to Admission medications    Medication Sig Start Date End Date Taking? Authorizing Provider   LEVEMIR FLEXPEN 100 UNIT/ML injection pen 35 units subcut BID, titrate dose up to usual home dose by 5 units daily if BS running>200 through day  Patient taking differently: titrate dose up to usual home dose by 5 units daily if

## 2024-01-05 NOTE — DISCHARGE INSTRUCTIONS
Remove stent with string in 5 days.    Get a 24 hour urine collection to determine etiology of stone disease in next 2-3 weeks; Dr. Proctor's office will send information about this to you directly.  Follow up in Rich Proctor M.D.'s office in 6 weeks with KUABILIO prior.   Patient instructed to drink at least 10 eight ounce glasses of water a day to facilitate passage of any stones.   Expect to see intermittent visible blood in urine as long as stent is in place.  You may experience increased urgency and frequency of urination and pain (especially at the end of urination) associated with the stent.  Take the prescribed medications to help alleviate these symptoms.    Activity  You have had anesthesia today  Do not drive, operate heavy equipment, consume alcoholic beverages, or make any important decisions  for 24 hours   If you are taking pain medication: Do not drive or consume alcohol.  Take your time changing positions today. You may feel light headed or dizzy if you move too quickly.   Continue your home medications as ordered by your physician.  Diet   You can eat your normal diet when you feel well. You should start off with bland foods like chicken soup, toast, or yogurt. Then advance as tolerated.  Drink plenty of fluids (unless your doctor tells you not to). Your urine should be very lightly colored without a strong odor.

## 2024-01-05 NOTE — ANESTHESIA PRE PROCEDURE
Department of Anesthesiology  Preprocedure Note       Name:  Tracey Gonsalez   Age:  78 y.o.  :  1945                                          MRN:  [id number]         Date:  2024      Surgeon: Surgeon(s):  Pedro Pablo Proctor MD    Procedure: Procedure(s):  CYSTOSCOPY RIGHT URETEROSCOPY RIGHT STENT EXCHANGE AND RIGHT STONE EXTRACTION  HOLMIUM LASER ON STANDBY    Medications prior to admission:   Prior to Admission medications    Medication Sig Start Date End Date Taking? Authorizing Provider   LEVEMIR FLEXPEN 100 UNIT/ML injection pen 35 units subcut BID, titrate dose up to usual home dose by 5 units daily if BS running>200 through day  Patient taking differently: titrate dose up to usual home dose by 5 units daily if BS running>200 through day    58 units every morning  If BG less than 140 at bedtime, no insulin, if greater than 140, then 50 units 23   Parveen Hawkins MD   tamsulosin (FLOMAX) 0.4 MG capsule Take 1 capsule by mouth daily 23   Parveen Hawkins MD   Magnesium 500 MG CAPS Take 500 mg by mouth daily    Britt Cloud MD   vitamin B-12 (CYANOCOBALAMIN) 1000 MCG tablet Take 1 tablet by mouth daily    Britt Cloud MD   aspirin 81 MG EC tablet Aspir-81 81 MG Oral Tablet Delayed Release  1 Every Day   Quantity: 0;  Refills: 0       [facility], Physician ;  Started 9-Dec-2008  Active 08   Britt Cloud MD   atorvastatin (LIPITOR) 20 MG tablet Take 1 tablet by mouth at bedtime. 23   Britt Cloud MD   vitamin D (CHOLECALCIFEROL) 50 MCG ( UT) CAPS capsule Vitamin D CAPS  1 cherri   Refills: 0  Active    Britt Cloud MD   FARXIGA 10 MG tablet Take 1 tablet by mouth every morning 10/6/23   Britt Cloud MD   glimepiride (AMARYL) 4 MG tablet TAKE 2 TABLETS BY MOUTH EVERY DAY at supper 23   Provider, Historical, MD   FREESTYLE LITE strip USE TO TEST TWO TIMES A DAY AS DIRECTED 8/15/23   Britt Cloud MD

## 2024-01-07 LAB
EKG ATRIAL RATE: 76 BPM
EKG P AXIS: 31 DEGREES
EKG P-R INTERVAL: 170 MS
EKG Q-T INTERVAL: 380 MS
EKG QRS DURATION: 82 MS
EKG QTC CALCULATION (BAZETT): 427 MS
EKG R AXIS: -11 DEGREES
EKG T AXIS: 38 DEGREES
EKG VENTRICULAR RATE: 76 BPM

## 2024-01-08 DIAGNOSIS — N20.1 CALCULUS OF PROXIMAL RIGHT URETER: Primary | ICD-10-CM

## 2024-01-09 ENCOUNTER — HOSPITAL ENCOUNTER (OUTPATIENT)
Dept: MAMMOGRAPHY | Age: 79
Discharge: HOME | End: 2024-01-11
Attending: FAMILY MEDICINE
Payer: MEDICARE

## 2024-01-09 VITALS — HEIGHT: 65 IN | BODY MASS INDEX: 30.82 KG/M2 | WEIGHT: 185 LBS

## 2024-01-09 DIAGNOSIS — Z12.31 SCREENING MAMMOGRAM FOR BREAST CANCER: ICD-10-CM

## 2024-01-09 DIAGNOSIS — Z78.0 POSTMENOPAUSAL: ICD-10-CM

## 2024-01-09 PROCEDURE — 77063 BREAST TOMOSYNTHESIS BI: CPT

## 2024-01-09 PROCEDURE — 77080 DXA BONE DENSITY AXIAL: CPT

## 2024-01-11 ENCOUNTER — PROCEDURE VISIT (OUTPATIENT)
Age: 79
End: 2024-01-11
Payer: MEDICARE

## 2024-01-11 VITALS — WEIGHT: 185 LBS | HEIGHT: 65 IN | BODY MASS INDEX: 30.82 KG/M2

## 2024-01-11 DIAGNOSIS — N20.1 RIGHT URETERAL CALCULUS: Primary | ICD-10-CM

## 2024-01-11 DIAGNOSIS — R31.29 MICROHEMATURIA: ICD-10-CM

## 2024-01-11 LAB
BILIRUBIN, POC: NORMAL
BLOOD URINE, POC: NORMAL
CLARITY, POC: CLEAR
COLOR, POC: YELLOW
GLUCOSE URINE, POC: NORMAL
KETONES, POC: NORMAL
LEUKOCYTE EST, POC: NORMAL
NITRITE, POC: NORMAL
PH, POC: NORMAL
PROTEIN, POC: NORMAL
SPECIFIC GRAVITY, POC: NORMAL
UROBILINOGEN, POC: NORMAL

## 2024-01-11 PROCEDURE — 52310 CYSTOSCOPY AND TREATMENT: CPT | Performed by: SPECIALIST

## 2024-01-11 PROCEDURE — 81003 URINALYSIS AUTO W/O SCOPE: CPT | Performed by: SPECIALIST

## 2024-01-11 RX ORDER — CEFADROXIL 500 MG/1
500 CAPSULE ORAL 2 TIMES DAILY
Qty: 6 CAPSULE | Refills: 0 | Status: SHIPPED | OUTPATIENT
Start: 2024-01-11

## 2024-01-11 NOTE — PROGRESS NOTES
Pedro Pablo Proctor MD FACS    [facility] Urology Office Progress Note    Patient:  Tracey Gonsalez  YOB: 1945  Date: 1/11/2024    HISTORY OF PRESENT ILLNESS:   The patient is a 78 y.o. female  Patient here for a Cystoscopy and Right ureteral removal since her string broke off after 1/5/24 Right Holmium laser lithotripsy.    Summary of old records:   1/5/24 R HLL for a 2 mm proximal ureteral calculus    Additional History: none    Procedures Today:   Cystoscopy Operative Note (1/11/24):  Surgeon: Pedro Pablo Proctor MD, FACS  Anesthesia: Urethral 2% Xylocaine  Indications: Right ureteral calculus s/p stent insertion  Position: Dorsal Lithotomy  Findings:   The patient was prepped and draped in the usual sterile fashion.  The flexible cystoscope was advanced through the urethra and into the bladder.  The bladder was thoroughly inspected and the following was noted:  Vagina: normal appearing vagina with normal color and discharge, no lesions, atrophic  Residual Urine: mild  Urethra: normal appearing urethra with no masses, tenderness or lesions  Bladder: No tumors or CIS noted.  No bladder diverticulum.  There was moderate trabeculation noted.  Ureters: Clear efflux from left ureter.  Orifices with normal configuration and location.  Patient's Right ureteral stent grasped using grasping forceps and removed from the bladder.  The cystoscope was removed.  The patient tolerated the procedure well.  The patient was given a prescription for Cefadroxil 500 mg to prevent infection.     Urinalysis today:  Results for POC orders placed in visit on 01/11/24   POCT Urinalysis No Micro (Auto)   Result Value Ref Range    Color, UA yellow     Clarity, UA clear     Glucose, UA  mg     Bilirubin, UA      Ketones, UA      Spec Grav, UA      Blood, UA POC mod     pH, UA      Protein, UA POC 30 mg     Urobilinogen, UA      Leukocytes, UA small     Nitrite, UA neg        Last BUN and

## 2024-01-15 RX ORDER — HYDROCHLOROTHIAZIDE 12.5 MG/1
CAPSULE, GELATIN COATED ORAL DAILY
Qty: 30 CAPSULE | Refills: 5 | Status: SHIPPED | OUTPATIENT
Start: 2024-01-15

## 2024-01-15 NOTE — TELEPHONE ENCOUNTER
Tracey Gonsalez is calling to request a refill on the following medication(s):    Medication Request:  Requested Prescriptions     Pending Prescriptions Disp Refills    hydroCHLOROthiazide (MICROZIDE) 12.5 MG capsule [Pharmacy Med Name: hydroCHLOROthiazide Oral Capsule 12.5 MG] 30 capsule 0     Sig: TAKE 1 CAPSULE BY MOUTH EVERY DAY       Last Visit Date (If Applicable):  12/27/2023    Next Visit Date:    2/8/2024

## 2024-01-29 RX ORDER — METFORMIN HYDROCHLORIDE 500 MG/1
TABLET, EXTENDED RELEASE ORAL
Qty: 120 TABLET | Refills: 5 | Status: SHIPPED | OUTPATIENT
Start: 2024-01-29

## 2024-01-29 NOTE — TELEPHONE ENCOUNTER
Tracey Gonsalez is calling to request a refill on the following medication(s):    Medication Request:  Requested Prescriptions     Pending Prescriptions Disp Refills    metFORMIN (GLUCOPHAGE-XR) 500 MG extended release tablet [Pharmacy Med Name: metFORMIN HCl ER Oral Tablet Extended Release 24 Hour 500 MG] 120 tablet 0     Sig: TAKE 2 TABLETS BY MOUTH TWO TIMES A DAY       Last Visit Date (If Applicable):  12/27/2023    Next Visit Date:    2/8/2024

## 2024-02-13 ENCOUNTER — OFFICE VISIT (OUTPATIENT)
Age: 79
End: 2024-02-13
Payer: MEDICARE

## 2024-02-13 VITALS
HEIGHT: 65 IN | BODY MASS INDEX: 31.09 KG/M2 | OXYGEN SATURATION: 97 % | DIASTOLIC BLOOD PRESSURE: 64 MMHG | SYSTOLIC BLOOD PRESSURE: 120 MMHG | HEART RATE: 80 BPM | WEIGHT: 186.6 LBS

## 2024-02-13 DIAGNOSIS — R01.1 HEART MURMUR: ICD-10-CM

## 2024-02-13 DIAGNOSIS — N18.31 CHRONIC KIDNEY DISEASE, STAGE 3A (HCC): ICD-10-CM

## 2024-02-13 DIAGNOSIS — I10 ESSENTIAL HYPERTENSION: ICD-10-CM

## 2024-02-13 DIAGNOSIS — E11.8 TYPE II DIABETES MELLITUS WITH MANIFESTATIONS (HCC): Primary | ICD-10-CM

## 2024-02-13 DIAGNOSIS — M85.89 OSTEOPENIA OF MULTIPLE SITES: ICD-10-CM

## 2024-02-13 DIAGNOSIS — E78.2 MIXED HYPERLIPIDEMIA: ICD-10-CM

## 2024-02-13 PROCEDURE — G8417 CALC BMI ABV UP PARAM F/U: HCPCS | Performed by: FAMILY MEDICINE

## 2024-02-13 PROCEDURE — 1036F TOBACCO NON-USER: CPT | Performed by: FAMILY MEDICINE

## 2024-02-13 PROCEDURE — 1090F PRES/ABSN URINE INCON ASSESS: CPT | Performed by: FAMILY MEDICINE

## 2024-02-13 PROCEDURE — G8427 DOCREV CUR MEDS BY ELIG CLIN: HCPCS | Performed by: FAMILY MEDICINE

## 2024-02-13 PROCEDURE — G8484 FLU IMMUNIZE NO ADMIN: HCPCS | Performed by: FAMILY MEDICINE

## 2024-02-13 PROCEDURE — G8399 PT W/DXA RESULTS DOCUMENT: HCPCS | Performed by: FAMILY MEDICINE

## 2024-02-13 PROCEDURE — 99214 OFFICE O/P EST MOD 30 MIN: CPT | Performed by: FAMILY MEDICINE

## 2024-02-13 PROCEDURE — 3074F SYST BP LT 130 MM HG: CPT | Performed by: FAMILY MEDICINE

## 2024-02-13 PROCEDURE — 3078F DIAST BP <80 MM HG: CPT | Performed by: FAMILY MEDICINE

## 2024-02-13 PROCEDURE — 1123F ACP DISCUSS/DSCN MKR DOCD: CPT | Performed by: FAMILY MEDICINE

## 2024-02-13 NOTE — PROGRESS NOTES
[facility]  [facility] MEDICINE  [address]  [place] [address]     Date of Visit:  2024  Patient Name: Tracey Gonsalez   Patient :  1945     CHIEF COMPLAINT/HPI:     Chief Complaint   Patient presents with    Check-Up     Patient was in the hospital before [facility] and came in and seen you so this is a follow up from that appointment.         HPI      Tracey Gonsalez, 78 y.o. presents today for follow-up.     She had cystoscopy for right ureteral calculus on 2024 by Dr. Proctor. She has follow-up scheduled for 2024. Her energy level is finally back to normal.  No fevers or chills.  No dysuria/hematuria or flank pain.      She reports having trouble falling asleep at night since being in the hospital, but she states there has been an improvement over the past 2-3 nights. She is no longer taking Flomax or the antibiotic. She has been using a continuous glucose monitor for the past 6 weeks which she finds to be beneficial.     She denies headaches, dizziness, syncope, chest pain, dyspnea, palpitations, nausea, vomiting, diarrhea, constipation, blood in her stool, dark stool, urinary frequency/urgency/hematuria/dysuria, swelling in her extremities, vision changes, fever or chills.    She has diabetic food exams with endo (Dr. Caceres). She denies numbness or tingling in her feet. She follows with the eye doctor routinely.     She had removal of atypical lesion of the left cheek with positive margin by Dr. Robles on 2024. She is scheduled for follow-up with [facility] tomorrow.     She is taking vitamin D supplementation with calcium, but is unsure if it's calcium citrate.     REVIEW OF SYSTEM      Review of Systems:   Constitutional:  Negative for chills, fatigue, fever and unexpected weight change.   Eyes:  Negative for visual disturbance.   Respiratory:  Negative for cough, chest tightness, shortness of breath and wheezing.

## 2024-03-06 RX ORDER — PEN NEEDLE, DIABETIC 32GX 5/32"
NEEDLE, DISPOSABLE MISCELLANEOUS
Qty: 100 EACH | Refills: 2 | Status: SHIPPED | OUTPATIENT
Start: 2024-03-06

## 2024-03-06 NOTE — TELEPHONE ENCOUNTER
Tracey Gonsalez is calling to request a refill on the following medication(s):    Medication Request:  Requested Prescriptions     Pending Prescriptions Disp Refills    BD PEN NEEDLE ESVIN 2ND GEN 32G X 4 MM MISC [Pharmacy Med Name: BD Pen Needle Esvin 2nd Gen Miscellaneous 32G X 4 MM]  0     Sig: USE TO INJECT MEDICATION IN THE MORNING AND AT BEDTIME.       Last Visit Date (If Applicable):  2/13/2024    Next Visit Date:    7/18/2024

## 2024-04-02 ENCOUNTER — HOSPITAL ENCOUNTER (OUTPATIENT)
Age: 79
Setting detail: SPECIMEN
Discharge: HOME | End: 2024-04-02

## 2024-04-02 LAB
ALBUMIN SERPL-MCNC: 4.2 G/DL (ref 3.5–5.2)
ALBUMIN/GLOB SERPL: 1 {RATIO} (ref 1–2.5)
ALP SERPL-CCNC: 82 U/L (ref 35–104)
ALT SERPL-CCNC: 19 U/L (ref 10–35)
ANION GAP SERPL CALCULATED.3IONS-SCNC: 12 MMOL/L (ref 9–16)
AST SERPL-CCNC: 17 U/L (ref 10–35)
BILIRUB SERPL-MCNC: 0.3 MG/DL (ref 0–1.2)
BUN SERPL-MCNC: 18 MG/DL (ref 8–23)
CALCIUM SERPL-MCNC: 9.7 MG/DL (ref 8.6–10.4)
CHLORIDE SERPL-SCNC: 103 MMOL/L (ref 98–107)
CHOLEST SERPL-MCNC: 117 MG/DL (ref 0–199)
CHOLESTEROL/HDL RATIO: 3
CO2 SERPL-SCNC: 23 MMOL/L (ref 20–31)
CREAT SERPL-MCNC: 0.8 MG/DL (ref 0.5–0.9)
CREAT UR-MCNC: 67.2 MG/DL (ref 28–217)
GFR SERPL CREATININE-BSD FRML MDRD: 74 ML/MIN/1.73M2
GLUCOSE SERPL-MCNC: 167 MG/DL (ref 74–99)
HDLC SERPL-MCNC: 38 MG/DL
LDLC SERPL CALC-MCNC: 55 MG/DL (ref 0–100)
MICROALBUMIN UR-MCNC: <12 MG/L (ref 0–20)
MICROALBUMIN/CREAT UR-RTO: NORMAL MCG/MG CREAT (ref 0–25)
POTASSIUM SERPL-SCNC: 4.2 MMOL/L (ref 3.7–5.3)
PROT SERPL-MCNC: 7.6 G/DL (ref 6.6–8.7)
SODIUM SERPL-SCNC: 138 MMOL/L (ref 136–145)
T4 FREE SERPL-MCNC: 1.2 NG/DL (ref 0.92–1.68)
TRIGL SERPL-MCNC: 118 MG/DL
TSH SERPL DL<=0.05 MIU/L-ACNC: 1.65 UIU/ML (ref 0.27–4.2)
VLDLC SERPL CALC-MCNC: 24 MG/DL

## 2024-04-08 ENCOUNTER — HOSPITAL ENCOUNTER (OUTPATIENT)
Age: 79
Discharge: HOME | End: 2024-04-10
Attending: FAMILY MEDICINE
Payer: MEDICARE

## 2024-04-08 VITALS — HEIGHT: 65 IN | WEIGHT: 186 LBS | BODY MASS INDEX: 30.99 KG/M2

## 2024-04-08 DIAGNOSIS — R01.1 HEART MURMUR: ICD-10-CM

## 2024-04-08 LAB
ECHO AO ROOT DIAM: 3.5 CM
ECHO AO ROOT INDEX: 1.82 CM/M2
ECHO AR MAX VEL PISA: 3.2 M/S
ECHO AV AREA PEAK VELOCITY: 2.4 CM2
ECHO AV AREA VTI: 2.6 CM2
ECHO AV AREA/BSA PEAK VELOCITY: 1.3 CM2/M2
ECHO AV AREA/BSA VTI: 1.4 CM2/M2
ECHO AV MEAN GRADIENT: 6 MMHG
ECHO AV MEAN VELOCITY: 1.2 M/S
ECHO AV PEAK GRADIENT: 9 MMHG
ECHO AV PEAK VELOCITY: 1.5 M/S
ECHO AV REGURGITANT PHT: 260 MS
ECHO AV VELOCITY RATIO: 0.67
ECHO AV VTI: 31 CM
ECHO BSA: 1.97 M2
ECHO EST RA PRESSURE: 3 MMHG
ECHO IVC EXP: 1 CM
ECHO IVC INSP: 0.4 CM
ECHO LA AREA 2C: 14.8 CM2
ECHO LA AREA 4C: 17.2 CM2
ECHO LA DIAMETER INDEX: 2.24 CM/M2
ECHO LA DIAMETER: 4.3 CM
ECHO LA MAJOR AXIS: 4.9 CM
ECHO LA MINOR AXIS: 4.6 CM
ECHO LA TO AORTIC ROOT RATIO: 1.23
ECHO LA VOL BP: 44 ML (ref 22–52)
ECHO LA VOL MOD A2C: 40 ML (ref 22–52)
ECHO LA VOL MOD A4C: 47 ML (ref 22–52)
ECHO LA VOL/BSA BIPLANE: 23 ML/M2 (ref 16–34)
ECHO LA VOLUME INDEX MOD A2C: 21 ML/M2 (ref 16–34)
ECHO LA VOLUME INDEX MOD A4C: 24 ML/M2 (ref 16–34)
ECHO LV E' LATERAL VELOCITY: 8 CM/S
ECHO LV E' SEPTAL VELOCITY: 4 CM/S
ECHO LV FRACTIONAL SHORTENING: 25 % (ref 28–44)
ECHO LV INTERNAL DIMENSION DIASTOLE INDEX: 2.08 CM/M2
ECHO LV INTERNAL DIMENSION DIASTOLIC: 4 CM (ref 3.9–5.3)
ECHO LV INTERNAL DIMENSION SYSTOLIC INDEX: 1.56 CM/M2
ECHO LV INTERNAL DIMENSION SYSTOLIC: 3 CM
ECHO LV IVSD: 1.2 CM (ref 0.6–0.9)
ECHO LV MASS 2D: 145.6 G (ref 67–162)
ECHO LV MASS INDEX 2D: 75.9 G/M2 (ref 43–95)
ECHO LV POSTERIOR WALL DIASTOLIC: 1 CM (ref 0.6–0.9)
ECHO LV RELATIVE WALL THICKNESS RATIO: 0.5
ECHO LVOT AREA: 3.5 CM2
ECHO LVOT AV VTI INDEX: 0.75
ECHO LVOT DIAM: 2.1 CM
ECHO LVOT MEAN GRADIENT: 3 MMHG
ECHO LVOT PEAK GRADIENT: 4 MMHG
ECHO LVOT PEAK VELOCITY: 1 M/S
ECHO LVOT STROKE VOLUME INDEX: 42.2 ML/M2
ECHO LVOT SV: 81 ML
ECHO LVOT VTI: 23.4 CM
ECHO MV A VELOCITY: 0.85 M/S
ECHO MV E VELOCITY: 0.39 M/S
ECHO MV E/A RATIO: 0.46
ECHO MV E/E' LATERAL: 4.88
ECHO MV E/E' RATIO (AVERAGED): 7.31
ECHO RIGHT VENTRICULAR SYSTOLIC PRESSURE (RVSP): 20 MMHG
ECHO RV BASAL DIMENSION: 2.9 CM
ECHO RV FREE WALL PEAK S': 9 CM/S
ECHO TV REGURGITANT MAX VELOCITY: 2.05 M/S
ECHO TV REGURGITANT PEAK GRADIENT: 17 MMHG

## 2024-04-08 PROCEDURE — 93306 TTE W/DOPPLER COMPLETE: CPT

## 2024-04-18 ENCOUNTER — TELEPHONE (OUTPATIENT)
Age: 79
End: 2024-04-18

## 2024-04-18 ENCOUNTER — OFFICE VISIT (OUTPATIENT)
Age: 79
End: 2024-04-18
Payer: MEDICARE

## 2024-04-18 ENCOUNTER — HOSPITAL ENCOUNTER (OUTPATIENT)
Age: 79
Setting detail: SPECIMEN
Discharge: HOME | End: 2024-04-18

## 2024-04-18 VITALS
DIASTOLIC BLOOD PRESSURE: 70 MMHG | HEART RATE: 93 BPM | HEIGHT: 65 IN | WEIGHT: 189 LBS | BODY MASS INDEX: 31.49 KG/M2 | OXYGEN SATURATION: 96 % | SYSTOLIC BLOOD PRESSURE: 122 MMHG

## 2024-04-18 DIAGNOSIS — J30.2 SEASONAL ALLERGIC RHINITIS, UNSPECIFIED TRIGGER: ICD-10-CM

## 2024-04-18 DIAGNOSIS — N39.46 MIXED INCONTINENCE: ICD-10-CM

## 2024-04-18 DIAGNOSIS — N39.46 MIXED INCONTINENCE: Primary | ICD-10-CM

## 2024-04-18 DIAGNOSIS — R30.0 DYSURIA: Primary | ICD-10-CM

## 2024-04-18 DIAGNOSIS — R05.1 ACUTE COUGH: ICD-10-CM

## 2024-04-18 LAB
BACTERIA URNS QL MICRO: NORMAL
BILIRUB UR QL STRIP: NEGATIVE
CASTS #/AREA URNS LPF: NORMAL /LPF (ref 0–8)
CLARITY UR: CLEAR
COLOR UR: YELLOW
EPI CELLS #/AREA URNS HPF: NORMAL /HPF (ref 0–5)
GLUCOSE UR STRIP-MCNC: ABNORMAL MG/DL
HGB UR QL STRIP.AUTO: NEGATIVE
KETONES UR STRIP-MCNC: ABNORMAL MG/DL
LEUKOCYTE ESTERASE UR QL STRIP: ABNORMAL
NITRITE UR QL STRIP: NEGATIVE
PH UR STRIP: 5 [PH] (ref 5–8)
PROT UR STRIP-MCNC: NEGATIVE MG/DL
RBC #/AREA URNS HPF: NORMAL /HPF (ref 0–4)
SP GR UR STRIP: 1.02 (ref 1–1.03)
UROBILINOGEN UR STRIP-ACNC: NORMAL EU/DL (ref 0–1)
WBC #/AREA URNS HPF: NORMAL /HPF (ref 0–5)

## 2024-04-18 PROCEDURE — 1036F TOBACCO NON-USER: CPT | Performed by: FAMILY MEDICINE

## 2024-04-18 PROCEDURE — 3074F SYST BP LT 130 MM HG: CPT | Performed by: FAMILY MEDICINE

## 2024-04-18 PROCEDURE — G8417 CALC BMI ABV UP PARAM F/U: HCPCS | Performed by: FAMILY MEDICINE

## 2024-04-18 PROCEDURE — G8428 CUR MEDS NOT DOCUMENT: HCPCS | Performed by: FAMILY MEDICINE

## 2024-04-18 PROCEDURE — G8399 PT W/DXA RESULTS DOCUMENT: HCPCS | Performed by: FAMILY MEDICINE

## 2024-04-18 PROCEDURE — 0509F URINE INCON PLAN DOCD: CPT | Performed by: FAMILY MEDICINE

## 2024-04-18 PROCEDURE — 1090F PRES/ABSN URINE INCON ASSESS: CPT | Performed by: FAMILY MEDICINE

## 2024-04-18 PROCEDURE — 99213 OFFICE O/P EST LOW 20 MIN: CPT | Performed by: FAMILY MEDICINE

## 2024-04-18 PROCEDURE — 3078F DIAST BP <80 MM HG: CPT | Performed by: FAMILY MEDICINE

## 2024-04-18 PROCEDURE — 1123F ACP DISCUSS/DSCN MKR DOCD: CPT | Performed by: FAMILY MEDICINE

## 2024-04-18 RX ORDER — LORATADINE 10 MG/1
10 TABLET ORAL DAILY
COMMUNITY

## 2024-04-18 RX ORDER — INSULIN GLARGINE 100 [IU]/ML
INJECTION, SOLUTION SUBCUTANEOUS
COMMUNITY
Start: 2024-04-10 | End: 2024-04-18

## 2024-04-18 NOTE — TELEPHONE ENCOUNTER
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

## 2024-04-18 NOTE — PROGRESS NOTES
[facility]  [facility] MEDICINE  [address]  [place] [address]     Date of Visit:  2024  Patient Name: Tracey Gonsalez   Patient :  1945   Patient Age: 79 y.o.  Patient Sex: female     PCP: Heidy Cummings DO    Chief Complaints:       1. UTI    HPI:    UTI:          The patient complains of symptoms of UTI.          The symptoms have been present for 3-4 days.          The symptoms are moderate. .          Associated symptoms include increased urge to urinate, itching and burning with urination    She's had urinary urgency intermittently for the past year and has had trouble with fully emptying. She has a history of stress incontinence with coughing and sneezing and typically wears incontinence pads just in case. She admits to having urinary incontinence since she had the kidney stone. Staring last weekend, she had incontinence with standing. She also experienced itching and burning with urination last Friday, Saturday, and . Symptoms have improved without treatment or cranberry juice. She did not drink more fluid than usual over the weekend, however she has been trying to increase her consumption due to history of kidney stone.     She also presents today with rhinorrhea, dry throat, and cough. Symptoms worsen when she lays down as night and interrupt sleep due to post-nasal drip running down the back of her throat. The cough had improved when she was taking allergy medication routinely, however she stopped the medication for a period of time but has since restarted it about a week and a half ago. Denies heartburn. She has a history of cough from Lisinopril.     ROS:     GENERAL/CONSTITUTIONAL: Malaise denies.  Chills denies.  Fever denies.    ENT: Nasal turbinates swollen.  SKIN: Rash denies.      CARDIOVASCULAR: Edema denies.  Palpitations denies. 2/6 systolic ejection murmur.  RESPIRATORY: Chest pain denies.  Cough denies.      GASTROINTESTINAL:

## 2024-04-19 LAB
MICROORGANISM SPEC CULT: NORMAL
SPECIMEN DESCRIPTION: NORMAL

## 2024-04-22 NOTE — TELEPHONE ENCOUNTER
Please call Dr. Proctor again and see if he can see her earlier than July for incontinence. She is going to do pelvic PT for stress but also having urge and I wasn't sure if he had urodynamics done

## 2024-04-23 ENCOUNTER — PATIENT MESSAGE (OUTPATIENT)
Age: 79
End: 2024-04-23

## 2024-04-23 NOTE — TELEPHONE ENCOUNTER
From: Tracey Gonsalez  To: Dr. Heidy Cummings  Sent: 4/23/2024 3:38 PM EDT  Subject: Pelvic Floor Therapy     I received an e-mail from [facility] to schedule pelvic floor therapy. After reading about this type of therapy I don’t think I want to do this. It sounds pretty invasive to me and I don’t feel my bladder leakage is bad enough to go through this. You had mentioned contacting Dr. Nolasco regarding a prescription that may help. I would be willing to try this.    Thanks for your help.  Tracey Gonsalez

## 2024-04-24 NOTE — TELEPHONE ENCOUNTER
Spoke with [facility] office they said they will reach out and try and get her in sometime this week as long as the times work for the patient. They will reach out to her.

## 2024-04-29 ENCOUNTER — OFFICE VISIT (OUTPATIENT)
Age: 79
End: 2024-04-29
Payer: MEDICARE

## 2024-04-29 ENCOUNTER — HOSPITAL ENCOUNTER (OUTPATIENT)
Age: 79
Setting detail: SPECIMEN
Discharge: HOME | End: 2024-04-29

## 2024-04-29 VITALS — WEIGHT: 189 LBS | BODY MASS INDEX: 31.49 KG/M2 | HEIGHT: 65 IN

## 2024-04-29 DIAGNOSIS — Z87.442 HISTORY OF KIDNEY STONES: ICD-10-CM

## 2024-04-29 DIAGNOSIS — R31.29 MICROHEMATURIA: ICD-10-CM

## 2024-04-29 DIAGNOSIS — N39.41 URGE INCONTINENCE: Primary | ICD-10-CM

## 2024-04-29 DIAGNOSIS — R35.0 FREQUENCY OF MICTURITION: ICD-10-CM

## 2024-04-29 DIAGNOSIS — B37.31 CANDIDA VAGINITIS: ICD-10-CM

## 2024-04-29 PROCEDURE — 99214 OFFICE O/P EST MOD 30 MIN: CPT | Performed by: SPECIALIST

## 2024-04-29 PROCEDURE — G8427 DOCREV CUR MEDS BY ELIG CLIN: HCPCS | Performed by: SPECIALIST

## 2024-04-29 PROCEDURE — G8417 CALC BMI ABV UP PARAM F/U: HCPCS | Performed by: SPECIALIST

## 2024-04-29 PROCEDURE — 1036F TOBACCO NON-USER: CPT | Performed by: SPECIALIST

## 2024-04-29 PROCEDURE — 1090F PRES/ABSN URINE INCON ASSESS: CPT | Performed by: SPECIALIST

## 2024-04-29 PROCEDURE — 1123F ACP DISCUSS/DSCN MKR DOCD: CPT | Performed by: SPECIALIST

## 2024-04-29 PROCEDURE — 0509F URINE INCON PLAN DOCD: CPT | Performed by: SPECIALIST

## 2024-04-29 PROCEDURE — 81003 URINALYSIS AUTO W/O SCOPE: CPT | Performed by: SPECIALIST

## 2024-04-29 PROCEDURE — G8399 PT W/DXA RESULTS DOCUMENT: HCPCS | Performed by: SPECIALIST

## 2024-04-29 RX ORDER — FLUCONAZOLE 150 MG/1
150 TABLET ORAL
Qty: 2 TABLET | Refills: 0 | Status: SHIPPED | OUTPATIENT
Start: 2024-04-29 | End: 2024-05-05

## 2024-04-29 NOTE — PROGRESS NOTES
Pedro Pablo Proctor MD [facility] Urology Office Progress Note    Patient:  Tracey Gonsalez  YOB: 1945  Date: 4/29/2024    HISTORY OF PRESENT ILLNESS:   The patient is a 79 y.o. female  Patient had worsening urgency and frequency and Urge urinary incontinence requiring 2-3 heavy pads a day several weeks ago.  She is better now but she still wears 1 thin pad per day.  Urine sent for C&S to rule out infection.   Patient instructed to avoid bladder irritants in diet such as coffee, tea, caffeine, alcohol, carbonated beverages, spicy/acidic foods.  Patient given a list of these to avoid.   Patient was instructed to keep a voiding diary for 3 days in order to document urine output, frequency of urination and functional bladder capacity.   Patient given samples of Gemtesa (vibegron) 75 mg po qd for OAB.  Will check a KUB to make sure she doesn't have a distal ureteral calculus causing her urgency.  Lower urinary tract symptoms: urgency, frequency, hesitancy, nocturia, 1 times per night, and incomplete emptying     Today's AUA Symptom Score (QOL): 10 (3)    Summary of old records:   1/5/24 R HLL for a 2 mm proximal ureteral calculus; 1/11/24 cysto, stent removal  1/25/24 Litholink: volume=2310 mL, Iv=940, oxalate=29, citrate=1382, Vt=679   Urgency and frequency, urge urinary incontinence: samples Gemtesa (vibegron) 75 mg po qd     Additional History: none    Procedures Today: N/A    Urinalysis today:  Results for POC orders placed in visit on 04/29/24   POCT Urinalysis No Micro (Auto)   Result Value Ref Range    Color, UA yellow     Clarity, UA clear     Glucose, UA  mg/dl     Bilirubin, UA      Ketones, UA      Spec Grav, UA      Blood, UA POC small     pH, UA      Protein, UA POC neg     Urobilinogen, UA      Leukocytes, UA trace     Nitrite, UA neg        Last BUN and creatinine:  Lab Results   Component Value Date    BUN 18 04/02/2024     Lab Results   Component Value Date

## 2024-05-02 DIAGNOSIS — N39.0 RECURRENT UTI: Primary | ICD-10-CM

## 2024-05-02 LAB
MICROORGANISM SPEC CULT: ABNORMAL
MICROORGANISM SPEC CULT: ABNORMAL
SPECIMEN DESCRIPTION: ABNORMAL

## 2024-05-02 RX ORDER — NITROFURANTOIN 25; 75 MG/1; MG/1
100 CAPSULE ORAL 2 TIMES DAILY
Qty: 14 CAPSULE | Refills: 0 | Status: SHIPPED | OUTPATIENT
Start: 2024-05-02

## 2024-05-16 ENCOUNTER — HOSPITAL ENCOUNTER (OUTPATIENT)
Age: 79
Setting detail: SPECIMEN
Discharge: HOME | End: 2024-05-16

## 2024-05-17 LAB
MICROORGANISM SPEC CULT: NORMAL
SPECIMEN DESCRIPTION: NORMAL

## 2024-05-30 ENCOUNTER — OFFICE VISIT (OUTPATIENT)
Age: 79
End: 2024-05-30
Payer: MEDICARE

## 2024-05-30 DIAGNOSIS — R31.29 MICROHEMATURIA: ICD-10-CM

## 2024-05-30 DIAGNOSIS — N39.41 URGE INCONTINENCE: Primary | ICD-10-CM

## 2024-05-30 DIAGNOSIS — Z87.442 HISTORY OF KIDNEY STONES: ICD-10-CM

## 2024-05-30 DIAGNOSIS — R35.0 FREQUENCY OF MICTURITION: ICD-10-CM

## 2024-05-30 LAB
BILIRUBIN, POC: NORMAL
BLOOD URINE, POC: NORMAL
CLARITY, POC: CLEAR
COLOR, POC: YELLOW
GLUCOSE URINE, POC: NORMAL
KETONES, POC: NORMAL
LEUKOCYTE EST, POC: NORMAL
NITRITE, POC: NORMAL
PH, POC: NORMAL
POST VOID RESIDUAL (PVR): 14 ML
PROTEIN, POC: NORMAL
SPECIFIC GRAVITY, POC: NORMAL
UROBILINOGEN, POC: NORMAL

## 2024-05-30 PROCEDURE — 1123F ACP DISCUSS/DSCN MKR DOCD: CPT | Performed by: SPECIALIST

## 2024-05-30 PROCEDURE — 51798 US URINE CAPACITY MEASURE: CPT | Performed by: SPECIALIST

## 2024-05-30 PROCEDURE — 99214 OFFICE O/P EST MOD 30 MIN: CPT | Performed by: SPECIALIST

## 2024-05-30 PROCEDURE — G8427 DOCREV CUR MEDS BY ELIG CLIN: HCPCS | Performed by: SPECIALIST

## 2024-05-30 PROCEDURE — 81003 URINALYSIS AUTO W/O SCOPE: CPT | Performed by: SPECIALIST

## 2024-05-30 PROCEDURE — G8417 CALC BMI ABV UP PARAM F/U: HCPCS | Performed by: SPECIALIST

## 2024-05-30 PROCEDURE — 1090F PRES/ABSN URINE INCON ASSESS: CPT | Performed by: SPECIALIST

## 2024-05-30 PROCEDURE — G8399 PT W/DXA RESULTS DOCUMENT: HCPCS | Performed by: SPECIALIST

## 2024-05-30 PROCEDURE — 0509F URINE INCON PLAN DOCD: CPT | Performed by: SPECIALIST

## 2024-05-30 PROCEDURE — 1036F TOBACCO NON-USER: CPT | Performed by: SPECIALIST

## 2024-05-30 NOTE — PROGRESS NOTES
Pedro Pablo Proctor MD [facility] Urology Office Progress Note    Patient:  Tracey Gonsalez  YOB: 1945  Date: 5/30/2024    HISTORY OF PRESENT ILLNESS:   The patient is a 79 y.o. female  Patient is doing much better on Gemtesa (vibegron) 75 mg po qd for OAB and was given an Rx today.  Her frequency is better and she no longer wears pads for her Urge urinary incontinence.  Patient's voiding diary shows a maximal bladder capacity of 800 mL (normal is 400 mL) and daily urine outputs of 1775 mL a day (normal is 1500 mL/d) with an average of 7 voids in 24 hours and 1 voids per night.  Patient told to restrict excessive daytime fluid intake to 50 ounces or less a day to minimize frequency.   Patient's 5/29/24 KUB shows no obvious recurrent kidney stones.  Lower urinary tract symptoms: urgency, frequency, nocturia, 1 times per night, and feelings of FRED.    Last AUA Symptom Score (QOL): 10 (3)  Today's AUA Symptom Score (QOL): 5 (2)    Summary of old records:   1/5/24 R HLL for a 2 mm proximal ureteral calculus; 1/11/24 cysto, stent removal  1/25/24 Litholink: volume=2310 mL, Qk=135, oxalate=29, citrate=1382, Qi=868   Urgency and frequency, urge urinary incontinence: samples Gemtesa (vibegron) 75 mg po qd   5/9/24 VD: SIB=333 mL, TUX=9676 mL/d, TV=7 voids/d, JYW=981 mL/void, Nx1, NPi=42%, NUP=97 mL/hr     Additional History: none    Procedures Today: N/A    Urinalysis today:  Results for POC orders placed in visit on 05/30/24   POCT Urinalysis No Micro (Auto)   Result Value Ref Range    Color, UA yellow     Clarity, UA clear     Glucose, UA POC 500mg/dl     Bilirubin, UA      Ketones, UA      Spec Grav, UA      Blood, UA POC small     pH, UA      Protein, UA POC neg     Urobilinogen, UA      Leukocytes, UA trace     Nitrite, UA neg        Last BUN and creatinine:  Lab Results   Component Value Date    BUN 18 04/02/2024     Lab Results   Component Value Date    CREATININE 0.8 04/02/2024

## 2024-06-06 ENCOUNTER — TELEPHONE (OUTPATIENT)
Age: 79
End: 2024-06-06

## 2024-06-06 RX ORDER — TROSPIUM CHLORIDE 20 MG/1
20 TABLET, FILM COATED ORAL 2 TIMES DAILY
Qty: 60 TABLET | Refills: 11 | Status: SHIPPED | OUTPATIENT
Start: 2024-06-06

## 2024-06-06 NOTE — TELEPHONE ENCOUNTER
Patient would like to have Trospium Chloride sent to [facility] [address] Instead of [facility].

## 2024-06-06 NOTE — TELEPHONE ENCOUNTER
See Rx for Trospium 20 mg po bid for OAB symptoms.   If cost is an issue, can see if she qualifies for free Gemtesa (vibegron) 75 mg po qd for OAB and discuss with Brenda when she returns next week.

## 2024-06-24 NOTE — TELEPHONE ENCOUNTER
Tracey Gonsalez is calling to request a refill on the following medication(s):    Medication Request:  Requested Prescriptions     Pending Prescriptions Disp Refills    metoprolol tartrate (LOPRESSOR) 25 MG tablet [Pharmacy Med Name: Metoprolol Tartrate Oral Tablet 25 MG] 90 tablet 0     Sig: TAKE 1 TABLET BY MOUTH EVERY DAY WITH FOOD       Last Visit Date (If Applicable):  4/18/2024    Next Visit Date:    7/18/2024

## 2024-07-18 ENCOUNTER — OFFICE VISIT (OUTPATIENT)
Age: 79
End: 2024-07-18
Payer: MEDICARE

## 2024-07-18 VITALS
HEIGHT: 65 IN | BODY MASS INDEX: 31.79 KG/M2 | OXYGEN SATURATION: 95 % | WEIGHT: 190.8 LBS | SYSTOLIC BLOOD PRESSURE: 124 MMHG | HEART RATE: 95 BPM | DIASTOLIC BLOOD PRESSURE: 70 MMHG

## 2024-07-18 DIAGNOSIS — E11.9 INSULIN DEPENDENT TYPE 2 DIABETES MELLITUS (HCC): ICD-10-CM

## 2024-07-18 DIAGNOSIS — E78.2 MIXED HYPERLIPIDEMIA: ICD-10-CM

## 2024-07-18 DIAGNOSIS — I10 ESSENTIAL HYPERTENSION: Primary | ICD-10-CM

## 2024-07-18 DIAGNOSIS — M85.89 OSTEOPENIA OF MULTIPLE SITES: ICD-10-CM

## 2024-07-18 DIAGNOSIS — M71.379 SYNOVIAL CYST OF ANKLE AND FOOT REGION: ICD-10-CM

## 2024-07-18 DIAGNOSIS — Z79.4 INSULIN DEPENDENT TYPE 2 DIABETES MELLITUS (HCC): ICD-10-CM

## 2024-07-18 DIAGNOSIS — N18.31 CHRONIC KIDNEY DISEASE, STAGE 3A (HCC): ICD-10-CM

## 2024-07-18 DIAGNOSIS — M25.561 ACUTE PAIN OF RIGHT KNEE: ICD-10-CM

## 2024-07-18 DIAGNOSIS — I35.1 MILD AORTIC REGURGITATION: ICD-10-CM

## 2024-07-18 PROCEDURE — 1123F ACP DISCUSS/DSCN MKR DOCD: CPT | Performed by: FAMILY MEDICINE

## 2024-07-18 PROCEDURE — 3074F SYST BP LT 130 MM HG: CPT | Performed by: FAMILY MEDICINE

## 2024-07-18 PROCEDURE — 99214 OFFICE O/P EST MOD 30 MIN: CPT | Performed by: FAMILY MEDICINE

## 2024-07-18 PROCEDURE — 1036F TOBACCO NON-USER: CPT | Performed by: FAMILY MEDICINE

## 2024-07-18 PROCEDURE — G8399 PT W/DXA RESULTS DOCUMENT: HCPCS | Performed by: FAMILY MEDICINE

## 2024-07-18 PROCEDURE — 1090F PRES/ABSN URINE INCON ASSESS: CPT | Performed by: FAMILY MEDICINE

## 2024-07-18 PROCEDURE — G8427 DOCREV CUR MEDS BY ELIG CLIN: HCPCS | Performed by: FAMILY MEDICINE

## 2024-07-18 PROCEDURE — 3078F DIAST BP <80 MM HG: CPT | Performed by: FAMILY MEDICINE

## 2024-07-18 PROCEDURE — G8417 CALC BMI ABV UP PARAM F/U: HCPCS | Performed by: FAMILY MEDICINE

## 2024-07-18 RX ORDER — INSULIN GLARGINE 100 [IU]/ML
60 INJECTION, SOLUTION SUBCUTANEOUS NIGHTLY
COMMUNITY

## 2024-07-18 NOTE — PROGRESS NOTES
40     LA Volume MOD A4C 04/08/2024 47     LA Volume BP 04/08/2024 44     LA Diameter 04/08/2024 4.3     AR PHT 04/08/2024 260.0     AR Max Velocity PISA 04/08/2024 3.2     AV Peak Velocity 04/08/2024 1.5     AV Peak Gradient 04/08/2024 9     AV Mean Gradient 04/08/2024 6     AV VTI 04/08/2024 31.0     AV Mean Velocity 04/08/2024 1.2     AV Area by VTI 04/08/2024 2.6     AV Area by Peak Velocity 04/08/2024 2.4     Aortic Root 04/08/2024 3.5     IVC Expiration 04/08/2024 1.0     IVC Inspiration 04/08/2024 0.4     IVSd 04/08/2024 1.2 (A)     LVIDd 04/08/2024 4.0     LVIDs 04/08/2024 3.0     LVOT Diameter 04/08/2024 2.1     LVOT Mean Gradient 04/08/2024 3     LVOT VTI 04/08/2024 23.4     LVOT Peak Velocity 04/08/2024 1.0     LVOT Peak Gradient 04/08/2024 4     LVPWd 04/08/2024 1.0 (A)     LV E' Lateral Velocity 04/08/2024 8     LV E' Septal Velocity 04/08/2024 4     LVOT Area 04/08/2024 3.5     LVOT SV 04/08/2024 81.0     MV A Velocity 04/08/2024 0.85     MV E Velocity 04/08/2024 0.39     RV Basal Dimension 04/08/2024 2.9     RV Free Wall Peak S' 04/08/2024 9     TR Max Velocity 04/08/2024 2.05     TR Peak Gradient 04/08/2024 17     Body Surface Area 04/08/2024 1.97     Fractional Shortening 2D 04/08/2024 25     LVIDd Index 04/08/2024 2.08     LVIDs Index 04/08/2024 1.56     LV RWT Ratio 04/08/2024 0.50     LV Mass 2D 04/08/2024 145.6     LV Mass 2D Index 04/08/2024 75.9     MV E/A 04/08/2024 0.46     E/E' Ratio (Averaged) 04/08/2024 7.31     E/E' Lateral 04/08/2024 4.88     LA Volume Index BP 04/08/2024 23     LVOT Stroke Volume Index 04/08/2024 42.2     LA Volume Index MOD A2C 04/08/2024 21     LA Volume Index MOD A4C 04/08/2024 24     LA Size Index 04/08/2024 2.24     LA/AO Root Ratio 04/08/2024 1.23     Ao Root Index 04/08/2024 1.82     AV Velocity Ratio 04/08/2024 0.67     LVOT:AV VTI Index 04/08/2024 0.75     LATRICE/BSA VTI 04/08/2024 1.4     LATRICE/BSA Peak Velocity 04/08/2024 1.3     Est. RA Pressure 04/08/2024 3

## 2024-07-22 RX ORDER — METFORMIN HYDROCHLORIDE 500 MG/1
TABLET, EXTENDED RELEASE ORAL
Qty: 120 TABLET | Refills: 5 | Status: SHIPPED | OUTPATIENT
Start: 2024-07-22

## 2024-07-22 NOTE — TELEPHONE ENCOUNTER
Tracey Gonsalez is calling to request a refill on the following medication(s):    Medication Request:  Requested Prescriptions     Pending Prescriptions Disp Refills    metFORMIN (GLUCOPHAGE-XR) 500 MG extended release tablet [Pharmacy Med Name: metFORMIN HCl ER Oral Tablet Extended Release 24 Hour 500 MG] 120 tablet 0     Sig: TAKE 2 TABLETS BY MOUTH 2 TIMES A DAY       Last Visit Date (If Applicable):  7/18/2024    Next Visit Date:    1/20/2025

## 2024-07-29 RX ORDER — HYDROCHLOROTHIAZIDE 12.5 MG/1
CAPSULE, GELATIN COATED ORAL DAILY
Qty: 30 CAPSULE | Refills: 11 | Status: SHIPPED | OUTPATIENT
Start: 2024-07-29

## 2024-07-29 NOTE — TELEPHONE ENCOUNTER
Tracey Gonsalez is calling to request a refill on the following medication(s):    Medication Request:  Requested Prescriptions     Pending Prescriptions Disp Refills    hydroCHLOROthiazide 12.5 MG capsule [Pharmacy Med Name: hydroCHLOROthiazide Oral Capsule 12.5 MG] 30 capsule 0     Sig: TAKE 1 CAPSULE BY MOUTH EVERY DAY       Last Visit Date (If Applicable):  7/18/2024    Next Visit Date:    1/20/2025

## 2024-07-30 ENCOUNTER — HOSPITAL ENCOUNTER (OUTPATIENT)
Dept: GENERAL RADIOLOGY | Age: 79
Discharge: HOME | End: 2024-08-01
Payer: MEDICARE

## 2024-07-30 ENCOUNTER — HOSPITAL ENCOUNTER (OUTPATIENT)
Age: 79
Discharge: HOME | End: 2024-08-01
Payer: MEDICARE

## 2024-07-30 DIAGNOSIS — M25.561 ACUTE PAIN OF RIGHT KNEE: ICD-10-CM

## 2024-07-30 PROCEDURE — 73562 X-RAY EXAM OF KNEE 3: CPT

## 2024-08-09 ENCOUNTER — TELEPHONE (OUTPATIENT)
Age: 79
End: 2024-08-09

## 2024-08-09 ENCOUNTER — HOSPITAL ENCOUNTER (OUTPATIENT)
Age: 79
Setting detail: SPECIMEN
Discharge: HOME | End: 2024-08-09

## 2024-08-09 DIAGNOSIS — N39.0 RECURRENT UTI: Primary | ICD-10-CM

## 2024-08-09 LAB
BACTERIA URNS QL MICRO: ABNORMAL
BILIRUB UR QL STRIP: NEGATIVE
CASTS #/AREA URNS LPF: ABNORMAL /LPF (ref 0–8)
CLARITY UR: ABNORMAL
COLOR UR: YELLOW
EPI CELLS #/AREA URNS HPF: ABNORMAL /HPF (ref 0–5)
GLUCOSE UR STRIP-MCNC: ABNORMAL MG/DL
HGB UR QL STRIP.AUTO: ABNORMAL
KETONES UR STRIP-MCNC: NEGATIVE MG/DL
LEUKOCYTE ESTERASE UR QL STRIP: ABNORMAL
NITRITE UR QL STRIP: NEGATIVE
PH UR STRIP: 5.5 [PH] (ref 5–8)
PROT UR STRIP-MCNC: NEGATIVE MG/DL
RBC #/AREA URNS HPF: ABNORMAL /HPF (ref 0–4)
SP GR UR STRIP: 1.02 (ref 1–1.03)
UROBILINOGEN UR STRIP-ACNC: NORMAL EU/DL (ref 0–1)
WBC #/AREA URNS HPF: ABNORMAL /HPF (ref 0–5)

## 2024-08-09 RX ORDER — NITROFURANTOIN 25; 75 MG/1; MG/1
100 CAPSULE ORAL 2 TIMES DAILY
Qty: 20 CAPSULE | Refills: 0 | Status: SHIPPED | OUTPATIENT
Start: 2024-08-09 | End: 2024-08-19

## 2024-08-09 NOTE — TELEPHONE ENCOUNTER
She advised that she already got notification of the medication from pharmacy so she already picked it up, but she advised she will go give a urine sample to the lab [place] the [address].

## 2024-08-11 ENCOUNTER — PATIENT MESSAGE (OUTPATIENT)
Age: 79
End: 2024-08-11

## 2024-08-11 LAB
MICROORGANISM SPEC CULT: ABNORMAL
MICROORGANISM SPEC CULT: ABNORMAL
SPECIMEN DESCRIPTION: ABNORMAL

## 2024-08-11 RX ORDER — SULFAMETHOXAZOLE AND TRIMETHOPRIM 800; 160 MG/1; MG/1
1 TABLET ORAL 2 TIMES DAILY
Qty: 14 TABLET | Refills: 0 | OUTPATIENT
Start: 2024-08-11 | End: 2024-08-18

## 2024-08-12 ENCOUNTER — TELEPHONE (OUTPATIENT)
Age: 79
End: 2024-08-12

## 2024-08-12 RX ORDER — SULFAMETHOXAZOLE AND TRIMETHOPRIM 800; 160 MG/1; MG/1
1 TABLET ORAL 2 TIMES DAILY
Qty: 14 TABLET | Refills: 0 | Status: SHIPPED | OUTPATIENT
Start: 2024-08-12 | End: 2024-08-19

## 2024-08-26 ENCOUNTER — PATIENT MESSAGE (OUTPATIENT)
Age: 79
End: 2024-08-26

## 2024-08-26 DIAGNOSIS — N39.0 RECURRENT UTI: Primary | ICD-10-CM

## 2024-08-28 ENCOUNTER — HOSPITAL ENCOUNTER (OUTPATIENT)
Age: 79
Setting detail: SPECIMEN
Discharge: HOME | End: 2024-08-28

## 2024-08-28 LAB
BACTERIA URNS QL MICRO: NORMAL
BILIRUB UR QL STRIP: NEGATIVE
CASTS #/AREA URNS LPF: NORMAL /LPF (ref 0–8)
CLARITY UR: CLEAR
COLOR UR: YELLOW
EPI CELLS #/AREA URNS HPF: NORMAL /HPF (ref 0–5)
GLUCOSE UR STRIP-MCNC: ABNORMAL MG/DL
HGB UR QL STRIP.AUTO: NEGATIVE
KETONES UR STRIP-MCNC: NEGATIVE MG/DL
LEUKOCYTE ESTERASE UR QL STRIP: ABNORMAL
NITRITE UR QL STRIP: NEGATIVE
PH UR STRIP: 5 [PH] (ref 5–8)
PROT UR STRIP-MCNC: NEGATIVE MG/DL
RBC #/AREA URNS HPF: NORMAL /HPF (ref 0–4)
SP GR UR STRIP: 1.02 (ref 1–1.03)
UROBILINOGEN UR STRIP-ACNC: NORMAL EU/DL (ref 0–1)
WBC #/AREA URNS HPF: NORMAL /HPF (ref 0–5)

## 2024-08-29 LAB
MICROORGANISM SPEC CULT: NORMAL
SPECIMEN DESCRIPTION: NORMAL

## 2024-09-17 RX ORDER — METOPROLOL TARTRATE 25 MG/1
TABLET, FILM COATED ORAL
Qty: 90 TABLET | Refills: 3 | Status: SHIPPED | OUTPATIENT
Start: 2024-09-17

## 2024-10-02 ENCOUNTER — PATIENT MESSAGE (OUTPATIENT)
Age: 79
End: 2024-10-02

## 2024-10-29 RX ORDER — PEN NEEDLE, DIABETIC 32GX 5/32"
NEEDLE, DISPOSABLE MISCELLANEOUS
Qty: 100 EACH | Refills: 3 | Status: SHIPPED | OUTPATIENT
Start: 2024-10-29

## 2024-10-29 NOTE — TELEPHONE ENCOUNTER
Tracey Gonsalez is calling to request a refill on the following medication(s):    Medication Request:  Requested Prescriptions     Pending Prescriptions Disp Refills    BD PEN NEEDLE ESVIN 2ND GEN 32G X 4 MM MISC [Pharmacy Med Name: BD Pen Needle Esvin 2nd Gen Miscellaneous 32G X 4 MM]  0     Sig: USE TO INJECT MEDICATION IN THE MORNING AND AT BEDTIME.       Last Visit Date (If Applicable):  7/18/2024    Next Visit Date:    1/20/2025

## 2024-12-02 ENCOUNTER — OFFICE VISIT (OUTPATIENT)
Age: 79
End: 2024-12-02
Payer: MEDICARE

## 2024-12-02 VITALS — BODY MASS INDEX: 31.65 KG/M2 | HEIGHT: 65 IN | WEIGHT: 190 LBS

## 2024-12-02 DIAGNOSIS — Z87.442 HISTORY OF KIDNEY STONES: ICD-10-CM

## 2024-12-02 DIAGNOSIS — R35.0 FREQUENCY OF MICTURITION: ICD-10-CM

## 2024-12-02 DIAGNOSIS — N39.41 URGE INCONTINENCE: Primary | ICD-10-CM

## 2024-12-02 DIAGNOSIS — R31.29 MICROHEMATURIA: ICD-10-CM

## 2024-12-02 LAB
BILIRUBIN, POC: NORMAL
BLOOD URINE, POC: NORMAL
CLARITY, POC: CLEAR
COLOR, POC: YELLOW
GLUCOSE URINE, POC: NORMAL MG/DL
KETONES, POC: NORMAL
LEUKOCYTE EST, POC: NORMAL
NITRITE, POC: NORMAL
PH, POC: NORMAL
PROTEIN, POC: NORMAL MG/DL
SPECIFIC GRAVITY, POC: NORMAL
UROBILINOGEN, POC: NORMAL

## 2024-12-02 PROCEDURE — 99214 OFFICE O/P EST MOD 30 MIN: CPT | Performed by: SPECIALIST

## 2024-12-02 PROCEDURE — G8427 DOCREV CUR MEDS BY ELIG CLIN: HCPCS | Performed by: SPECIALIST

## 2024-12-02 PROCEDURE — 1036F TOBACCO NON-USER: CPT | Performed by: SPECIALIST

## 2024-12-02 PROCEDURE — G8484 FLU IMMUNIZE NO ADMIN: HCPCS | Performed by: SPECIALIST

## 2024-12-02 PROCEDURE — 81003 URINALYSIS AUTO W/O SCOPE: CPT | Performed by: SPECIALIST

## 2024-12-02 PROCEDURE — 1090F PRES/ABSN URINE INCON ASSESS: CPT | Performed by: SPECIALIST

## 2024-12-02 PROCEDURE — 1159F MED LIST DOCD IN RCRD: CPT | Performed by: SPECIALIST

## 2024-12-02 PROCEDURE — G8399 PT W/DXA RESULTS DOCUMENT: HCPCS | Performed by: SPECIALIST

## 2024-12-02 PROCEDURE — 0509F URINE INCON PLAN DOCD: CPT | Performed by: SPECIALIST

## 2024-12-02 PROCEDURE — 1123F ACP DISCUSS/DSCN MKR DOCD: CPT | Performed by: SPECIALIST

## 2024-12-02 PROCEDURE — G8417 CALC BMI ABV UP PARAM F/U: HCPCS | Performed by: SPECIALIST

## 2024-12-02 RX ORDER — TROSPIUM CHLORIDE 20 MG/1
20 TABLET, FILM COATED ORAL 2 TIMES DAILY
Qty: 60 TABLET | Refills: 11 | Status: SHIPPED | OUTPATIENT
Start: 2024-12-02

## 2024-12-02 NOTE — PROGRESS NOTES
Pedro Pablo Proctor MD [facility] Urology Office Progress Note    Patient:  Tracey Gonsalez  YOB: 1945  Date: 12/2/2024    HISTORY OF PRESENT ILLNESS:   The patient is a 79 y.o. female  Patient's Urge urinary incontinence is controlled with Trospium 20 mg po bid for OAB symptoms.   Stable trace microhematuria on today's UA with negative workup in the past.  Thus, no further workup required.    No flank pain or gross hematuria to suggest recurrent kidney stones.   Will check a KUB in one year.  Lower urinary tract symptoms: urgency, hesitancy, nocturia, 1 times per night, and incomplete emptying.   Last AUA Symptom Score (QOL): 5 (2)  Today's AUA Symptom Score (QOL): 6 (2)    Summary of old records:   1/5/24 R HLL for a 2 mm proximal ureteral calculus; 1/11/24 cysto, stent removal  1/25/24 Litholink: volume=2310 mL, By=860, oxalate=29, citrate=1382, Sv=443   Urgency and frequency, urge urinary incontinence: Gemtesa (vibegron) 75 mg po qd worked; on Trospium 20 mg po bid  5/9/24 VD: ZYF=869 mL, EIT=0500 mL/d, TV=7 voids/d, KCB=883 mL/void, Nx1, NPi=42%, NUP=97 mL/hr     Additional History: none    Procedures Today: N/A    Urinalysis today:  Results for orders placed or performed in visit on 12/02/24   POCT Urinalysis No Micro (Auto)   Result Value Ref Range    Color, UA yellow     Clarity, UA clear     Glucose, UA POC >=1000 mg/dl mg/dL    Bilirubin, UA      Ketones, UA      Spec Grav, UA      Blood, UA POC trace-intact     pH, UA      Protein, UA POC neg mg/dL    Urobilinogen, UA      Leukocytes, UA trace     Nitrite, UA neg        Last BUN and creatinine:  Lab Results   Component Value Date    BUN 18 04/02/2024     Lab Results   Component Value Date    CREATININE 0.8 04/02/2024       Last CBC:  Lab Results   Component Value Date    WBC 9.7 01/02/2024    HGB 13.2 01/02/2024    HCT 41.8 01/02/2024    MCV 96.1 01/02/2024    PLT See Reflexed IPF Result 01/02/2024       Additional

## 2024-12-16 ENCOUNTER — HOSPITAL ENCOUNTER (OUTPATIENT)
Age: 79
Setting detail: SPECIMEN
Discharge: HOME | End: 2024-12-16

## 2024-12-16 DIAGNOSIS — E11.9 INSULIN DEPENDENT TYPE 2 DIABETES MELLITUS (HCC): ICD-10-CM

## 2024-12-16 DIAGNOSIS — E78.2 MIXED HYPERLIPIDEMIA: ICD-10-CM

## 2024-12-16 DIAGNOSIS — Z79.4 INSULIN DEPENDENT TYPE 2 DIABETES MELLITUS (HCC): ICD-10-CM

## 2024-12-16 LAB
ALBUMIN SERPL-MCNC: 4 G/DL (ref 3.5–5.2)
ALBUMIN SERPL-MCNC: 4.1 G/DL (ref 3.5–5.2)
ALBUMIN/GLOB SERPL: 1.3 {RATIO} (ref 1–2.5)
ALBUMIN/GLOB SERPL: 1.3 {RATIO} (ref 1–2.5)
ALP SERPL-CCNC: 94 U/L (ref 35–104)
ALP SERPL-CCNC: 95 U/L (ref 35–104)
ALT SERPL-CCNC: 20 U/L (ref 10–35)
ALT SERPL-CCNC: 21 U/L (ref 10–35)
ANION GAP SERPL CALCULATED.3IONS-SCNC: 11 MMOL/L (ref 9–16)
ANION GAP SERPL CALCULATED.3IONS-SCNC: 11 MMOL/L (ref 9–16)
AST SERPL-CCNC: 20 U/L (ref 10–35)
AST SERPL-CCNC: 21 U/L (ref 10–35)
BASOPHILS # BLD: 0.06 K/UL (ref 0–0.2)
BASOPHILS NFR BLD: 1 % (ref 0–2)
BILIRUB SERPL-MCNC: 0.3 MG/DL (ref 0–1.2)
BILIRUB SERPL-MCNC: 0.4 MG/DL (ref 0–1.2)
BUN SERPL-MCNC: 23 MG/DL (ref 8–23)
BUN SERPL-MCNC: 23 MG/DL (ref 8–23)
CALCIUM SERPL-MCNC: 9.1 MG/DL (ref 8.6–10.4)
CALCIUM SERPL-MCNC: 9.5 MG/DL (ref 8.6–10.4)
CHLORIDE SERPL-SCNC: 104 MMOL/L (ref 98–107)
CHLORIDE SERPL-SCNC: 104 MMOL/L (ref 98–107)
CHOLEST SERPL-MCNC: 119 MG/DL (ref 0–199)
CHOLESTEROL/HDL RATIO: 3.3
CO2 SERPL-SCNC: 23 MMOL/L (ref 20–31)
CO2 SERPL-SCNC: 23 MMOL/L (ref 20–31)
CREAT SERPL-MCNC: 0.8 MG/DL (ref 0.6–0.9)
CREAT SERPL-MCNC: 0.8 MG/DL (ref 0.6–0.9)
CREAT UR-MCNC: 86.8 MG/DL (ref 28–217)
EOSINOPHIL # BLD: 0.23 K/UL (ref 0–0.44)
EOSINOPHILS RELATIVE PERCENT: 3 % (ref 1–4)
ERYTHROCYTE [DISTWIDTH] IN BLOOD BY AUTOMATED COUNT: 13.4 % (ref 11.8–14.4)
GFR, ESTIMATED: 75 ML/MIN/1.73M2
GFR, ESTIMATED: 75 ML/MIN/1.73M2
GLUCOSE SERPL-MCNC: 188 MG/DL (ref 74–99)
GLUCOSE SERPL-MCNC: 192 MG/DL (ref 74–99)
HCT VFR BLD AUTO: 44.3 % (ref 36.3–47.1)
HDLC SERPL-MCNC: 36 MG/DL
HGB BLD-MCNC: 14.4 G/DL (ref 11.9–15.1)
IMM GRANULOCYTES # BLD AUTO: <0.03 K/UL (ref 0–0.3)
IMM GRANULOCYTES NFR BLD: 0 %
LDLC SERPL CALC-MCNC: 48 MG/DL (ref 0–100)
LYMPHOCYTES NFR BLD: 2.41 K/UL (ref 1.1–3.7)
LYMPHOCYTES RELATIVE PERCENT: 32 % (ref 24–43)
MAGNESIUM SERPL-MCNC: 2.1 MG/DL (ref 1.6–2.4)
MCH RBC QN AUTO: 31.6 PG (ref 25.2–33.5)
MCHC RBC AUTO-ENTMCNC: 32.5 G/DL (ref 28.4–34.8)
MCV RBC AUTO: 97.1 FL (ref 82.6–102.9)
MICROALBUMIN UR-MCNC: <12 MG/L (ref 0–20)
MICROALBUMIN/CREAT UR-RTO: NORMAL MCG/MG CREAT (ref 0–25)
MONOCYTES NFR BLD: 0.54 K/UL (ref 0.1–1.2)
MONOCYTES NFR BLD: 7 % (ref 3–12)
NEUTROPHILS NFR BLD: 57 % (ref 36–65)
NEUTS SEG NFR BLD: 4.34 K/UL (ref 1.5–8.1)
NRBC BLD-RTO: 0 PER 100 WBC
PLATELET # BLD AUTO: 251 K/UL (ref 138–453)
PMV BLD AUTO: 10.5 FL (ref 8.1–13.5)
POTASSIUM SERPL-SCNC: 4.3 MMOL/L (ref 3.7–5.3)
POTASSIUM SERPL-SCNC: 4.3 MMOL/L (ref 3.7–5.3)
PROT SERPL-MCNC: 7.1 G/DL (ref 6.6–8.7)
PROT SERPL-MCNC: 7.2 G/DL (ref 6.6–8.7)
RBC # BLD AUTO: 4.56 M/UL (ref 3.95–5.11)
SODIUM SERPL-SCNC: 138 MMOL/L (ref 136–145)
SODIUM SERPL-SCNC: 138 MMOL/L (ref 136–145)
T4 FREE SERPL-MCNC: 1.2 NG/DL (ref 0.92–1.68)
TRIGL SERPL-MCNC: 174 MG/DL
TSH SERPL DL<=0.05 MIU/L-ACNC: 1.67 UIU/ML (ref 0.27–4.2)
VIT B12 SERPL-MCNC: >2000 PG/ML (ref 232–1245)
VLDLC SERPL CALC-MCNC: 35 MG/DL (ref 1–30)
WBC OTHER # BLD: 7.6 K/UL (ref 3.5–11.3)

## 2025-01-28 NOTE — TELEPHONE ENCOUNTER
Tracey Gonsalez is calling to request a refill on the following medication(s):    Medication Request:  Requested Prescriptions     Pending Prescriptions Disp Refills    metFORMIN (GLUCOPHAGE-XR) 500 MG extended release tablet 120 tablet 5     Sig: Take 1 tablet by mouth daily (with breakfast)       Last Visit Date (If Applicable):  7/18/2024    Next Visit Date:    2/3/2025

## 2025-01-29 RX ORDER — METFORMIN HYDROCHLORIDE 500 MG/1
500 TABLET, EXTENDED RELEASE ORAL
Qty: 120 TABLET | Refills: 5 | Status: SHIPPED | OUTPATIENT
Start: 2025-01-29

## 2025-02-03 ENCOUNTER — OFFICE VISIT (OUTPATIENT)
Age: 80
End: 2025-02-03

## 2025-02-03 VITALS
DIASTOLIC BLOOD PRESSURE: 68 MMHG | SYSTOLIC BLOOD PRESSURE: 124 MMHG | RESPIRATION RATE: 12 BRPM | OXYGEN SATURATION: 96 % | HEIGHT: 65 IN | WEIGHT: 186 LBS | HEART RATE: 94 BPM | BODY MASS INDEX: 30.99 KG/M2

## 2025-02-03 DIAGNOSIS — N39.41 URGE INCONTINENCE: ICD-10-CM

## 2025-02-03 DIAGNOSIS — I10 ESSENTIAL HYPERTENSION: Primary | ICD-10-CM

## 2025-02-03 DIAGNOSIS — E11.9 INSULIN DEPENDENT TYPE 2 DIABETES MELLITUS (HCC): ICD-10-CM

## 2025-02-03 DIAGNOSIS — I35.1 MILD AORTIC REGURGITATION: ICD-10-CM

## 2025-02-03 DIAGNOSIS — Z12.31 ENCOUNTER FOR SCREENING MAMMOGRAM FOR MALIGNANT NEOPLASM OF BREAST: ICD-10-CM

## 2025-02-03 DIAGNOSIS — M85.89 OSTEOPENIA OF MULTIPLE SITES: ICD-10-CM

## 2025-02-03 DIAGNOSIS — Z79.4 INSULIN DEPENDENT TYPE 2 DIABETES MELLITUS (HCC): ICD-10-CM

## 2025-02-03 DIAGNOSIS — E78.2 MIXED HYPERLIPIDEMIA: ICD-10-CM

## 2025-02-03 PROBLEM — N18.31 CHRONIC KIDNEY DISEASE, STAGE 3A (HCC): Status: ACTIVE | Noted: 2025-02-03

## 2025-02-03 RX ORDER — METFORMIN HYDROCHLORIDE 500 MG/1
1000 TABLET, EXTENDED RELEASE ORAL 2 TIMES DAILY
Qty: 120 TABLET | Refills: 5
Start: 2025-02-03

## 2025-02-03 SDOH — ECONOMIC STABILITY: FOOD INSECURITY: WITHIN THE PAST 12 MONTHS, YOU WORRIED THAT YOUR FOOD WOULD RUN OUT BEFORE YOU GOT MONEY TO BUY MORE.: NEVER TRUE

## 2025-02-03 SDOH — ECONOMIC STABILITY: FOOD INSECURITY: WITHIN THE PAST 12 MONTHS, THE FOOD YOU BOUGHT JUST DIDN'T LAST AND YOU DIDN'T HAVE MONEY TO GET MORE.: NEVER TRUE

## 2025-02-03 ASSESSMENT — PATIENT HEALTH QUESTIONNAIRE - PHQ9
SUM OF ALL RESPONSES TO PHQ QUESTIONS 1-9: 0
2. FEELING DOWN, DEPRESSED OR HOPELESS: NOT AT ALL
1. LITTLE INTEREST OR PLEASURE IN DOING THINGS: NOT AT ALL
SUM OF ALL RESPONSES TO PHQ QUESTIONS 1-9: 0
SUM OF ALL RESPONSES TO PHQ QUESTIONS 1-9: 0
SUM OF ALL RESPONSES TO PHQ9 QUESTIONS 1 & 2: 0
SUM OF ALL RESPONSES TO PHQ QUESTIONS 1-9: 0

## 2025-02-03 NOTE — PROGRESS NOTES
[facility]  [facility]  [address]  [place] [address]  Dept: [phone]     Date of Visit:  2/3/2025  Patient Name: Tracey Gonsalez   Patient :  1945     CHIEF COMPLAINT/HPI:     Chief Complaint   Patient presents with    Discuss Labs        HPI      Tracey Gonsalez, 79 y.o. presents today for a follow up of hypertension, chronic kidney disease, insulin dependent type II diabetes mellitus, hyperlipidemia, mild aortic regurgitation, osteopenia, and urge incontinence. She reports her energy levels have been stable. She notes she is not exercising currently. She is eating well. She notes issues sleeping about one month ago but this issue resolved on its own.     She follows yearly with urology Dr. Proctor for her urge incontinence. She takes Sanctura 20mg twice daily.     She follows with endo Dr. Caceres, last seen 2024, she completes her diabetic foot exams. Her most recent hemoglobin A1c was 8. She wears a continuous glucose monitor and reports her average was 7.5. Routine medications she's taking for diabetes include Humalog sliding scale twice daily with meals, Metformin 1,000mg twice daily, Farxiga 10mg, insulin glargine 44 units in the morning, and Januvia 100mg. She denies numbness/tingling in her feet.  Her insulin was recently decreased due to hypoglycemia.     She takes HCTZ and lopressor daily which keep her blood pressure well controlled. She supplements vitamin D daily for her bone health.     She sees the eye doctor yearly.     She has received her shingles and pneumonia vaccinations.     She denies headaches, dizziness, syncope, chest pain, shortness of breath, nausea, vomiting, diarrhea, constipation, blood in her stool, edema, claudication, skin changes, vision changes, fever, or chills.      REVIEW OF SYSTEM      Review of Systems:   Constitutional:  Negative for chills, fatigue, fever and unexpected weight

## 2025-02-10 ENCOUNTER — PATIENT MESSAGE (OUTPATIENT)
Age: 80
End: 2025-02-10

## 2025-02-11 RX ORDER — METFORMIN HYDROCHLORIDE 500 MG/1
1000 TABLET, EXTENDED RELEASE ORAL 2 TIMES DAILY
Qty: 120 TABLET | Refills: 5 | Status: SHIPPED | OUTPATIENT
Start: 2025-02-11

## 2025-02-11 NOTE — TELEPHONE ENCOUNTER
Tracey Gonsalez is calling to request a refill on the following medication(s):    Medication Request:  Requested Prescriptions     Pending Prescriptions Disp Refills    metFORMIN (GLUCOPHAGE-XR) 500 MG extended release tablet 120 tablet 5     Sig: Take 2 tablets by mouth in the morning and at bedtime       Last Visit Date (If Applicable):  2/3/2025    Next Visit Date:    8/11/2025

## 2025-02-25 ENCOUNTER — HOSPITAL ENCOUNTER (OUTPATIENT)
Age: 80
Discharge: HOME | End: 2025-02-27
Attending: FAMILY MEDICINE
Payer: MEDICARE

## 2025-02-25 ENCOUNTER — HOSPITAL ENCOUNTER (OUTPATIENT)
Age: 80
End: 2025-02-25
Attending: FAMILY MEDICINE
Payer: MEDICARE

## 2025-02-25 DIAGNOSIS — Z12.31 ENCOUNTER FOR SCREENING MAMMOGRAM FOR MALIGNANT NEOPLASM OF BREAST: ICD-10-CM

## 2025-02-25 PROCEDURE — 77063 BREAST TOMOSYNTHESIS BI: CPT

## 2025-03-25 ENCOUNTER — TELEPHONE (OUTPATIENT)
Age: 80
End: 2025-03-25

## 2025-03-25 ENCOUNTER — RESULTS FOLLOW-UP (OUTPATIENT)
Age: 80
End: 2025-03-25

## 2025-03-25 ENCOUNTER — HOSPITAL ENCOUNTER (OUTPATIENT)
Age: 80
Setting detail: SPECIMEN
Discharge: HOME | End: 2025-03-25

## 2025-03-25 DIAGNOSIS — R30.0 DYSURIA: Primary | ICD-10-CM

## 2025-03-25 RX ORDER — CEPHALEXIN 500 MG/1
500 CAPSULE ORAL 3 TIMES DAILY
Qty: 30 CAPSULE | Refills: 0 | Status: SHIPPED | OUTPATIENT
Start: 2025-03-25 | End: 2025-04-04

## 2025-03-25 NOTE — TELEPHONE ENCOUNTER
Patient notified, informed of all (instructed not to start taking the atb until after she provides the specimen.)

## 2025-03-25 NOTE — TELEPHONE ENCOUNTER
Patient is having burning and pressure with urination patient would like a urine test can we order this? Sending to Doctor in office

## 2025-03-25 NOTE — RESULT ENCOUNTER NOTE
Urinalysis shows the beginning of a urinary tract infection continue antibiotics that were sent in.  We are waiting for the culture and sensitivity to come back

## 2025-03-26 LAB
MICROORGANISM SPEC CULT: NORMAL
SPECIMEN DESCRIPTION: NORMAL

## 2025-07-28 RX ORDER — HYDROCHLOROTHIAZIDE 12.5 MG/1
CAPSULE ORAL DAILY
Qty: 30 CAPSULE | Refills: 5 | Status: SHIPPED | OUTPATIENT
Start: 2025-07-28

## 2025-07-28 NOTE — TELEPHONE ENCOUNTER
Tracey Gonsalez is calling to request a refill on the following medication(s):    Medication Request:  Requested Prescriptions     Pending Prescriptions Disp Refills    hydroCHLOROthiazide 12.5 MG capsule [Pharmacy Med Name: hydroCHLOROthiazide Oral Capsule 12.5 MG] 30 capsule 0     Sig: TAKE 1 CAPSULE BY MOUTH EVERY DAY       Last Visit Date (If Applicable):  2/3/2025    Next Visit Date:    8/11/2025

## 2025-07-31 RX ORDER — PEN NEEDLE, DIABETIC 32GX 5/32"
NEEDLE, DISPOSABLE MISCELLANEOUS
Qty: 100 EACH | Refills: 1 | Status: SHIPPED | OUTPATIENT
Start: 2025-07-31

## 2025-07-31 NOTE — TELEPHONE ENCOUNTER
Tracey Gonsalez is calling to request a refill on the following medication(s):    Medication Request:  Requested Prescriptions     Pending Prescriptions Disp Refills    BD PEN NEEDLE ESVIN 2ND GEN 32G X 4 MM MISC [Pharmacy Med Name: BD Pen Needle Esvin 2nd Gen Miscellaneous 32G X 4 MM]  0     Sig: USE TO INJECT MEDICATION IN THE MORNING AND AT BEDTIME.       Last Visit Date (If Applicable):  2/3/2025    Next Visit Date:    8/11/2025

## 2025-08-11 ENCOUNTER — HOSPITAL ENCOUNTER (OUTPATIENT)
Age: 80
Setting detail: SPECIMEN
Discharge: HOME | End: 2025-08-11

## 2025-08-11 ENCOUNTER — OFFICE VISIT (OUTPATIENT)
Age: 80
End: 2025-08-11
Payer: MEDICARE

## 2025-08-11 VITALS
HEART RATE: 90 BPM | OXYGEN SATURATION: 97 % | WEIGHT: 188 LBS | TEMPERATURE: 98.1 F | SYSTOLIC BLOOD PRESSURE: 118 MMHG | HEIGHT: 65 IN | DIASTOLIC BLOOD PRESSURE: 60 MMHG | BODY MASS INDEX: 31.32 KG/M2

## 2025-08-11 DIAGNOSIS — I35.1 MILD AORTIC REGURGITATION: ICD-10-CM

## 2025-08-11 DIAGNOSIS — M85.89 OSTEOPENIA OF MULTIPLE SITES: ICD-10-CM

## 2025-08-11 DIAGNOSIS — I10 ESSENTIAL HYPERTENSION: ICD-10-CM

## 2025-08-11 DIAGNOSIS — Z00.00 MEDICARE ANNUAL WELLNESS VISIT, SUBSEQUENT: ICD-10-CM

## 2025-08-11 DIAGNOSIS — E11.22 TYPE 2 DIABETES MELLITUS WITH STAGE 2 CHRONIC KIDNEY DISEASE, WITH LONG-TERM CURRENT USE OF INSULIN (HCC): ICD-10-CM

## 2025-08-11 DIAGNOSIS — N39.41 URGE INCONTINENCE: ICD-10-CM

## 2025-08-11 DIAGNOSIS — Z79.4 TYPE 2 DIABETES MELLITUS WITH STAGE 2 CHRONIC KIDNEY DISEASE, WITH LONG-TERM CURRENT USE OF INSULIN (HCC): ICD-10-CM

## 2025-08-11 DIAGNOSIS — N18.2 TYPE 2 DIABETES MELLITUS WITH STAGE 2 CHRONIC KIDNEY DISEASE, WITH LONG-TERM CURRENT USE OF INSULIN (HCC): ICD-10-CM

## 2025-08-11 DIAGNOSIS — E11.9 INSULIN DEPENDENT TYPE 2 DIABETES MELLITUS (HCC): Primary | ICD-10-CM

## 2025-08-11 DIAGNOSIS — E78.2 MIXED HYPERLIPIDEMIA: ICD-10-CM

## 2025-08-11 DIAGNOSIS — Z79.4 INSULIN DEPENDENT TYPE 2 DIABETES MELLITUS (HCC): Primary | ICD-10-CM

## 2025-08-11 LAB
ALBUMIN SERPL-MCNC: 4.2 G/DL (ref 3.5–5.2)
ALBUMIN/GLOB SERPL: 1.3 {RATIO} (ref 1–2.5)
ALP SERPL-CCNC: 77 U/L (ref 35–104)
ALT SERPL-CCNC: 20 U/L (ref 10–35)
ANION GAP SERPL CALCULATED.3IONS-SCNC: 16 MMOL/L (ref 9–16)
AST SERPL-CCNC: 20 U/L (ref 10–35)
BILIRUB SERPL-MCNC: 0.5 MG/DL (ref 0–1.2)
BUN SERPL-MCNC: 26 MG/DL (ref 8–23)
CALCIUM SERPL-MCNC: 10.1 MG/DL (ref 8.6–10.4)
CHLORIDE SERPL-SCNC: 101 MMOL/L (ref 98–107)
CHOLEST SERPL-MCNC: 99 MG/DL (ref 0–199)
CHOLESTEROL/HDL RATIO: 2.8
CO2 SERPL-SCNC: 21 MMOL/L (ref 20–31)
CREAT SERPL-MCNC: 0.9 MG/DL (ref 0.6–0.9)
GFR, ESTIMATED: 65 ML/MIN/1.73M2
GLUCOSE SERPL-MCNC: 141 MG/DL (ref 74–99)
HDLC SERPL-MCNC: 36 MG/DL
LDLC SERPL CALC-MCNC: 39 MG/DL (ref 0–100)
POTASSIUM SERPL-SCNC: 4 MMOL/L (ref 3.7–5.3)
PROT SERPL-MCNC: 7.5 G/DL (ref 6.6–8.7)
SODIUM SERPL-SCNC: 138 MMOL/L (ref 136–145)
TRIGL SERPL-MCNC: 119 MG/DL
VLDLC SERPL CALC-MCNC: 24 MG/DL (ref 1–30)

## 2025-08-11 PROCEDURE — G8417 CALC BMI ABV UP PARAM F/U: HCPCS | Performed by: FAMILY MEDICINE

## 2025-08-11 PROCEDURE — 1123F ACP DISCUSS/DSCN MKR DOCD: CPT | Performed by: FAMILY MEDICINE

## 2025-08-11 PROCEDURE — 3078F DIAST BP <80 MM HG: CPT | Performed by: FAMILY MEDICINE

## 2025-08-11 PROCEDURE — G0439 PPPS, SUBSEQ VISIT: HCPCS | Performed by: FAMILY MEDICINE

## 2025-08-11 PROCEDURE — 1159F MED LIST DOCD IN RCRD: CPT | Performed by: FAMILY MEDICINE

## 2025-08-11 PROCEDURE — 1036F TOBACCO NON-USER: CPT | Performed by: FAMILY MEDICINE

## 2025-08-11 PROCEDURE — 1090F PRES/ABSN URINE INCON ASSESS: CPT | Performed by: FAMILY MEDICINE

## 2025-08-11 PROCEDURE — 99214 OFFICE O/P EST MOD 30 MIN: CPT | Performed by: FAMILY MEDICINE

## 2025-08-11 PROCEDURE — 0509F URINE INCON PLAN DOCD: CPT | Performed by: FAMILY MEDICINE

## 2025-08-11 PROCEDURE — G8399 PT W/DXA RESULTS DOCUMENT: HCPCS | Performed by: FAMILY MEDICINE

## 2025-08-11 PROCEDURE — G8427 DOCREV CUR MEDS BY ELIG CLIN: HCPCS | Performed by: FAMILY MEDICINE

## 2025-08-11 PROCEDURE — 3074F SYST BP LT 130 MM HG: CPT | Performed by: FAMILY MEDICINE

## 2025-08-11 PROCEDURE — 1160F RVW MEDS BY RX/DR IN RCRD: CPT | Performed by: FAMILY MEDICINE

## 2025-08-11 RX ORDER — INSULIN GLARGINE 100 [IU]/ML
30 INJECTION, SOLUTION SUBCUTANEOUS EVERY MORNING
Qty: 1 ADJUSTABLE DOSE PRE-FILLED PEN SYRINGE | Refills: 0 | Status: SHIPPED
Start: 2025-08-11

## 2025-08-11 ASSESSMENT — PATIENT HEALTH QUESTIONNAIRE - PHQ9
SUM OF ALL RESPONSES TO PHQ QUESTIONS 1-9: 0
1. LITTLE INTEREST OR PLEASURE IN DOING THINGS: NOT AT ALL
2. FEELING DOWN, DEPRESSED OR HOPELESS: NOT AT ALL

## 2025-08-11 ASSESSMENT — LIFESTYLE VARIABLES
HOW MANY STANDARD DRINKS CONTAINING ALCOHOL DO YOU HAVE ON A TYPICAL DAY: PATIENT DOES NOT DRINK
HOW OFTEN DO YOU HAVE A DRINK CONTAINING ALCOHOL: NEVER

## 2025-08-25 RX ORDER — METFORMIN HYDROCHLORIDE 500 MG/1
1000 TABLET, EXTENDED RELEASE ORAL 2 TIMES DAILY
Qty: 120 TABLET | Refills: 3 | Status: SHIPPED | OUTPATIENT
Start: 2025-08-25

## (undated) DEVICE — MHPB CYSTO PACK-LF: Brand: MEDLINE INDUSTRIES, INC.

## (undated) DEVICE — SINGLE-USE DIGITAL FLEXIBLE URETEROSCOPE: Brand: LITHOVUE

## (undated) DEVICE — STRIP,CLOSURE,WOUND,MEDI-STRIP,1/2X4: Brand: MEDLINE

## (undated) DEVICE — TOWEL,OR,DSP,ST,NATURAL,DLX,4/PK,20PK/CS: Brand: MEDLINE

## (undated) DEVICE — GUIDEWIRE URO L150CM DIA .035IN STIFF NIT HYDRPHL STR TIP

## (undated) DEVICE — DUAL LUMEN URETERAL CATHETER

## (undated) DEVICE — SOLUTION IRRIG 1000ML STRL H2O USP PLAS POUR BTL

## (undated) DEVICE — GLOVE ORANGE PI 8   MSG9080

## (undated) DEVICE — SYSTEM FLD COLL W/ FLX DRP SUPP AND DISP BG

## (undated) DEVICE — MASTISOL ADHESIVE LIQ 2/3ML

## (undated) DEVICE — SOLUTION IRRIG 3000ML 0.9% SOD CHL USP UROMATIC PLAS CONT